# Patient Record
Sex: FEMALE | Race: WHITE | Employment: OTHER | URBAN - METROPOLITAN AREA
[De-identification: names, ages, dates, MRNs, and addresses within clinical notes are randomized per-mention and may not be internally consistent; named-entity substitution may affect disease eponyms.]

---

## 2017-02-27 PROBLEM — F39 AFFECTIVE DISORDER (HCC): Status: ACTIVE | Noted: 2017-02-27

## 2017-02-27 PROBLEM — R73.9 ELEVATED BLOOD SUGAR: Status: ACTIVE | Noted: 2017-02-27

## 2017-10-17 PROBLEM — M79.2 NEUROPATHIC PAIN: Status: ACTIVE | Noted: 2017-10-17

## 2017-10-17 PROBLEM — K21.9 GASTROESOPHAGEAL REFLUX DISEASE WITHOUT ESOPHAGITIS: Status: ACTIVE | Noted: 2017-10-17

## 2017-10-17 PROBLEM — H40.9 GLAUCOMA: Status: ACTIVE | Noted: 2017-10-17

## 2018-04-05 ENCOUNTER — HOME HEALTH ADMISSION (OUTPATIENT)
Dept: HOME HEALTH SERVICES | Facility: HOME HEALTH | Age: 83
End: 2018-04-05
Payer: MEDICARE

## 2018-04-05 PROBLEM — G89.29 CHRONIC PAIN OF BOTH SHOULDERS: Status: ACTIVE | Noted: 2018-04-05

## 2018-04-05 PROBLEM — M25.511 CHRONIC PAIN OF BOTH SHOULDERS: Status: ACTIVE | Noted: 2018-04-05

## 2018-04-05 PROBLEM — M25.512 CHRONIC PAIN OF BOTH SHOULDERS: Status: ACTIVE | Noted: 2018-04-05

## 2018-04-07 ENCOUNTER — HOME CARE VISIT (OUTPATIENT)
Dept: SCHEDULING | Facility: HOME HEALTH | Age: 83
End: 2018-04-07
Payer: MEDICARE

## 2018-04-07 VITALS
RESPIRATION RATE: 16 BRPM | OXYGEN SATURATION: 96 % | DIASTOLIC BLOOD PRESSURE: 74 MMHG | HEART RATE: 74 BPM | TEMPERATURE: 98.1 F | SYSTOLIC BLOOD PRESSURE: 126 MMHG

## 2018-04-07 PROCEDURE — G0299 HHS/HOSPICE OF RN EA 15 MIN: HCPCS

## 2018-04-07 PROCEDURE — 3331090002 HH PPS REVENUE DEBIT

## 2018-04-07 PROCEDURE — 400013 HH SOC

## 2018-04-07 PROCEDURE — 3331090001 HH PPS REVENUE CREDIT

## 2018-04-08 PROCEDURE — 3331090001 HH PPS REVENUE CREDIT

## 2018-04-08 PROCEDURE — 3331090002 HH PPS REVENUE DEBIT

## 2018-04-09 PROCEDURE — 3331090002 HH PPS REVENUE DEBIT

## 2018-04-09 PROCEDURE — 3331090001 HH PPS REVENUE CREDIT

## 2018-04-10 ENCOUNTER — HOME CARE VISIT (OUTPATIENT)
Dept: SCHEDULING | Facility: HOME HEALTH | Age: 83
End: 2018-04-10
Payer: MEDICARE

## 2018-04-10 PROCEDURE — 3331090002 HH PPS REVENUE DEBIT

## 2018-04-10 PROCEDURE — G0299 HHS/HOSPICE OF RN EA 15 MIN: HCPCS

## 2018-04-10 PROCEDURE — 3331090001 HH PPS REVENUE CREDIT

## 2018-04-11 VITALS
DIASTOLIC BLOOD PRESSURE: 74 MMHG | RESPIRATION RATE: 20 BRPM | HEART RATE: 80 BPM | OXYGEN SATURATION: 94 % | SYSTOLIC BLOOD PRESSURE: 130 MMHG | TEMPERATURE: 98 F

## 2018-04-11 PROCEDURE — 3331090001 HH PPS REVENUE CREDIT

## 2018-04-11 PROCEDURE — 3331090002 HH PPS REVENUE DEBIT

## 2018-04-12 ENCOUNTER — HOME CARE VISIT (OUTPATIENT)
Dept: SCHEDULING | Facility: HOME HEALTH | Age: 83
End: 2018-04-12
Payer: MEDICARE

## 2018-04-12 VITALS
HEART RATE: 79 BPM | DIASTOLIC BLOOD PRESSURE: 70 MMHG | RESPIRATION RATE: 18 BRPM | OXYGEN SATURATION: 98 % | SYSTOLIC BLOOD PRESSURE: 132 MMHG | TEMPERATURE: 97.5 F

## 2018-04-12 PROCEDURE — 3331090002 HH PPS REVENUE DEBIT

## 2018-04-12 PROCEDURE — G0151 HHCP-SERV OF PT,EA 15 MIN: HCPCS

## 2018-04-12 PROCEDURE — 3331090001 HH PPS REVENUE CREDIT

## 2018-04-13 PROCEDURE — 3331090001 HH PPS REVENUE CREDIT

## 2018-04-13 PROCEDURE — 3331090002 HH PPS REVENUE DEBIT

## 2018-04-14 PROCEDURE — 3331090001 HH PPS REVENUE CREDIT

## 2018-04-14 PROCEDURE — 3331090002 HH PPS REVENUE DEBIT

## 2018-04-15 PROCEDURE — 3331090001 HH PPS REVENUE CREDIT

## 2018-04-15 PROCEDURE — 3331090002 HH PPS REVENUE DEBIT

## 2018-04-16 PROCEDURE — 3331090002 HH PPS REVENUE DEBIT

## 2018-04-16 PROCEDURE — 3331090001 HH PPS REVENUE CREDIT

## 2018-04-17 PROCEDURE — 3331090002 HH PPS REVENUE DEBIT

## 2018-04-17 PROCEDURE — 3331090001 HH PPS REVENUE CREDIT

## 2018-04-18 PROCEDURE — 3331090001 HH PPS REVENUE CREDIT

## 2018-04-18 PROCEDURE — 3331090002 HH PPS REVENUE DEBIT

## 2018-04-19 ENCOUNTER — HOME CARE VISIT (OUTPATIENT)
Dept: SCHEDULING | Facility: HOME HEALTH | Age: 83
End: 2018-04-19
Payer: MEDICARE

## 2018-04-19 VITALS
HEART RATE: 80 BPM | TEMPERATURE: 97.9 F | OXYGEN SATURATION: 98 % | SYSTOLIC BLOOD PRESSURE: 140 MMHG | RESPIRATION RATE: 24 BRPM | DIASTOLIC BLOOD PRESSURE: 80 MMHG

## 2018-04-19 PROCEDURE — G0157 HHC PT ASSISTANT EA 15: HCPCS

## 2018-04-19 PROCEDURE — 3331090002 HH PPS REVENUE DEBIT

## 2018-04-19 PROCEDURE — 3331090001 HH PPS REVENUE CREDIT

## 2018-04-20 PROCEDURE — 3331090002 HH PPS REVENUE DEBIT

## 2018-04-20 PROCEDURE — 3331090001 HH PPS REVENUE CREDIT

## 2018-04-21 PROCEDURE — 3331090002 HH PPS REVENUE DEBIT

## 2018-04-21 PROCEDURE — 3331090001 HH PPS REVENUE CREDIT

## 2018-04-22 PROCEDURE — 3331090002 HH PPS REVENUE DEBIT

## 2018-04-22 PROCEDURE — 3331090001 HH PPS REVENUE CREDIT

## 2018-04-23 PROCEDURE — 3331090002 HH PPS REVENUE DEBIT

## 2018-04-23 PROCEDURE — 3331090001 HH PPS REVENUE CREDIT

## 2018-04-24 ENCOUNTER — HOME CARE VISIT (OUTPATIENT)
Dept: SCHEDULING | Facility: HOME HEALTH | Age: 83
End: 2018-04-24
Payer: MEDICARE

## 2018-04-24 ENCOUNTER — HOME CARE VISIT (OUTPATIENT)
Dept: HOME HEALTH SERVICES | Facility: HOME HEALTH | Age: 83
End: 2018-04-24
Payer: MEDICARE

## 2018-04-24 VITALS
DIASTOLIC BLOOD PRESSURE: 94 MMHG | HEART RATE: 80 BPM | RESPIRATION RATE: 16 BRPM | SYSTOLIC BLOOD PRESSURE: 158 MMHG | TEMPERATURE: 97.5 F | OXYGEN SATURATION: 95 %

## 2018-04-24 VITALS
DIASTOLIC BLOOD PRESSURE: 80 MMHG | SYSTOLIC BLOOD PRESSURE: 130 MMHG | TEMPERATURE: 98 F | HEART RATE: 72 BPM | OXYGEN SATURATION: 97 % | RESPIRATION RATE: 18 BRPM

## 2018-04-24 PROCEDURE — G0299 HHS/HOSPICE OF RN EA 15 MIN: HCPCS

## 2018-04-24 PROCEDURE — 3331090001 HH PPS REVENUE CREDIT

## 2018-04-24 PROCEDURE — 3331090002 HH PPS REVENUE DEBIT

## 2018-04-24 PROCEDURE — G0157 HHC PT ASSISTANT EA 15: HCPCS

## 2018-04-25 PROCEDURE — 3331090001 HH PPS REVENUE CREDIT

## 2018-04-25 PROCEDURE — 3331090002 HH PPS REVENUE DEBIT

## 2018-04-26 PROCEDURE — 3331090002 HH PPS REVENUE DEBIT

## 2018-04-26 PROCEDURE — 3331090001 HH PPS REVENUE CREDIT

## 2018-04-27 PROCEDURE — 3331090001 HH PPS REVENUE CREDIT

## 2018-04-27 PROCEDURE — 3331090002 HH PPS REVENUE DEBIT

## 2018-04-28 PROCEDURE — 3331090001 HH PPS REVENUE CREDIT

## 2018-04-28 PROCEDURE — 3331090002 HH PPS REVENUE DEBIT

## 2018-04-29 PROCEDURE — 3331090002 HH PPS REVENUE DEBIT

## 2018-04-29 PROCEDURE — 3331090001 HH PPS REVENUE CREDIT

## 2018-04-30 ENCOUNTER — HOME CARE VISIT (OUTPATIENT)
Dept: SCHEDULING | Facility: HOME HEALTH | Age: 83
End: 2018-04-30
Payer: MEDICARE

## 2018-04-30 VITALS — RESPIRATION RATE: 18 BRPM | SYSTOLIC BLOOD PRESSURE: 126 MMHG | HEART RATE: 82 BPM | DIASTOLIC BLOOD PRESSURE: 74 MMHG

## 2018-04-30 PROCEDURE — 3331090001 HH PPS REVENUE CREDIT

## 2018-04-30 PROCEDURE — G0151 HHCP-SERV OF PT,EA 15 MIN: HCPCS

## 2018-04-30 PROCEDURE — 3331090002 HH PPS REVENUE DEBIT

## 2018-05-01 ENCOUNTER — HOME CARE VISIT (OUTPATIENT)
Dept: SCHEDULING | Facility: HOME HEALTH | Age: 83
End: 2018-05-01
Payer: MEDICARE

## 2018-05-01 VITALS
RESPIRATION RATE: 18 BRPM | HEART RATE: 83 BPM | DIASTOLIC BLOOD PRESSURE: 82 MMHG | SYSTOLIC BLOOD PRESSURE: 136 MMHG | TEMPERATURE: 98.5 F | OXYGEN SATURATION: 98 %

## 2018-05-01 LAB
INR BLD: 4.1 (ref 0.9–1.1)
PT POC: 45.6 SECONDS (ref 11.8–14.9)

## 2018-05-01 PROCEDURE — 3331090002 HH PPS REVENUE DEBIT

## 2018-05-01 PROCEDURE — 3331090001 HH PPS REVENUE CREDIT

## 2018-05-01 PROCEDURE — G0299 HHS/HOSPICE OF RN EA 15 MIN: HCPCS

## 2018-10-05 ENCOUNTER — HOSPITAL ENCOUNTER (OUTPATIENT)
Dept: LAB | Age: 83
Discharge: HOME | End: 2018-10-05

## 2018-10-05 LAB
INR PPP: 2.6
PROTHROMBIN TIME: 27 SEC (ref 11.5–14.5)

## 2018-10-05 PROCEDURE — 85610 PROTHROMBIN TIME: CPT

## 2019-01-11 ENCOUNTER — HOSPITAL ENCOUNTER (OUTPATIENT)
Dept: LAB | Age: 84
Discharge: HOME | End: 2019-01-11

## 2019-01-11 LAB
INR PPP: 1.8
PROTHROMBIN TIME: 20.8 SEC (ref 11.7–14.5)

## 2019-01-11 PROCEDURE — 85610 PROTHROMBIN TIME: CPT

## 2020-07-31 ENCOUNTER — HOME HEALTH ADMISSION (OUTPATIENT)
Dept: HOME HEALTH SERVICES | Facility: HOME HEALTH | Age: 85
End: 2020-07-31

## 2020-07-31 PROBLEM — M00.9 CHRONIC INFECTION OF RIGHT KNEE (HCC): Status: ACTIVE | Noted: 2020-07-31

## 2020-07-31 PROBLEM — Z91.199 H/O NONCOMPLIANCE WITH MEDICAL TREATMENT, PRESENTING HAZARDS TO HEALTH: Status: ACTIVE | Noted: 2020-07-31

## 2020-07-31 PROBLEM — R53.81 PHYSICAL DEBILITY: Status: ACTIVE | Noted: 2020-07-31

## 2020-07-31 PROBLEM — Z91.81 AT HIGH RISK FOR INJURY RELATED TO FALL: Status: ACTIVE | Noted: 2020-07-31

## 2021-01-01 ENCOUNTER — HOME CARE VISIT (OUTPATIENT)
Dept: SCHEDULING | Facility: HOME HEALTH | Age: 86
End: 2021-01-01
Payer: MEDICARE

## 2021-01-01 ENCOUNTER — HOSPITAL ENCOUNTER (OUTPATIENT)
Dept: LAB | Age: 86
Discharge: HOME | End: 2021-12-21

## 2021-01-01 ENCOUNTER — HOSPITAL ENCOUNTER (OUTPATIENT)
Dept: LAB | Age: 86
Discharge: HOME | End: 2021-10-04

## 2021-01-01 VITALS
DIASTOLIC BLOOD PRESSURE: 59 MMHG | SYSTOLIC BLOOD PRESSURE: 110 MMHG | TEMPERATURE: 97.3 F | HEART RATE: 60 BPM | RESPIRATION RATE: 18 BRPM | OXYGEN SATURATION: 97 %

## 2021-01-01 VITALS
RESPIRATION RATE: 18 BRPM | TEMPERATURE: 98.2 F | DIASTOLIC BLOOD PRESSURE: 84 MMHG | SYSTOLIC BLOOD PRESSURE: 178 MMHG | HEART RATE: 78 BPM | OXYGEN SATURATION: 95 %

## 2021-01-01 VITALS
RESPIRATION RATE: 18 BRPM | TEMPERATURE: 98.2 F | HEART RATE: 78 BPM | DIASTOLIC BLOOD PRESSURE: 112 MMHG | SYSTOLIC BLOOD PRESSURE: 149 MMHG | OXYGEN SATURATION: 97 %

## 2021-01-01 VITALS
DIASTOLIC BLOOD PRESSURE: 80 MMHG | OXYGEN SATURATION: 95 % | RESPIRATION RATE: 18 BRPM | TEMPERATURE: 97.4 F | HEART RATE: 68 BPM | SYSTOLIC BLOOD PRESSURE: 140 MMHG

## 2021-01-01 VITALS
DIASTOLIC BLOOD PRESSURE: 75 MMHG | RESPIRATION RATE: 18 BRPM | HEART RATE: 82 BPM | OXYGEN SATURATION: 97 % | TEMPERATURE: 97.9 F | SYSTOLIC BLOOD PRESSURE: 130 MMHG

## 2021-01-01 VITALS
SYSTOLIC BLOOD PRESSURE: 104 MMHG | RESPIRATION RATE: 18 BRPM | OXYGEN SATURATION: 97 % | TEMPERATURE: 96 F | DIASTOLIC BLOOD PRESSURE: 70 MMHG | HEART RATE: 54 BPM

## 2021-01-01 LAB
INR BLD: 2.4 (ref 0.9–1.1)
INR BLD: 5.9 (ref 0.9–1.1)
INR PPP: 1.2
INR PPP: 2
PROTHROMBIN TIME: 15.2 SEC (ref 12.6–14.5)
PROTHROMBIN TIME: 22.8 SEC (ref 12.6–14.5)
PT POC: 28.2 SECONDS (ref 11.8–14.9)
PT POC: 70.7 SECONDS (ref 11.8–14.9)

## 2021-01-01 PROCEDURE — 3331090002 HH PPS REVENUE DEBIT

## 2021-01-01 PROCEDURE — G0299 HHS/HOSPICE OF RN EA 15 MIN: HCPCS

## 2021-01-01 PROCEDURE — 3331090001 HH PPS REVENUE CREDIT

## 2021-01-01 PROCEDURE — 85610 PROTHROMBIN TIME: CPT

## 2021-01-01 PROCEDURE — A6454 SELF-ADHER BAND W>=3" <5"/YD: HCPCS

## 2021-01-05 ENCOUNTER — HOME HEALTH ADMISSION (OUTPATIENT)
Dept: HOME HEALTH SERVICES | Facility: HOME HEALTH | Age: 86
End: 2021-01-05
Payer: MEDICARE

## 2021-01-06 ENCOUNTER — HOME CARE VISIT (OUTPATIENT)
Dept: SCHEDULING | Facility: HOME HEALTH | Age: 86
End: 2021-01-06
Payer: MEDICARE

## 2021-01-06 VITALS
SYSTOLIC BLOOD PRESSURE: 102 MMHG | DIASTOLIC BLOOD PRESSURE: 62 MMHG | TEMPERATURE: 96.5 F | RESPIRATION RATE: 18 BRPM | OXYGEN SATURATION: 97 % | HEART RATE: 58 BPM

## 2021-01-06 PROCEDURE — A6223 GAUZE >16<=48 NO W/SAL W/O B: HCPCS

## 2021-01-06 PROCEDURE — A4452 WATERPROOF TAPE: HCPCS

## 2021-01-06 PROCEDURE — G0299 HHS/HOSPICE OF RN EA 15 MIN: HCPCS

## 2021-01-06 PROCEDURE — 400018 HH-NO PAY CLAIM PROCEDURE

## 2021-01-06 PROCEDURE — 400013 HH SOC

## 2021-01-06 PROCEDURE — 3331090002 HH PPS REVENUE DEBIT

## 2021-01-06 PROCEDURE — A6454 SELF-ADHER BAND W>=3" <5"/YD: HCPCS

## 2021-01-06 PROCEDURE — A6456 ZINC PASTE BAND W >=3"<5"/YD: HCPCS

## 2021-01-06 PROCEDURE — 3331090001 HH PPS REVENUE CREDIT

## 2021-01-06 PROCEDURE — A9270 NON-COVERED ITEM OR SERVICE: HCPCS

## 2021-01-07 PROCEDURE — 3331090002 HH PPS REVENUE DEBIT

## 2021-01-07 PROCEDURE — 3331090001 HH PPS REVENUE CREDIT

## 2021-01-08 ENCOUNTER — HOME CARE VISIT (OUTPATIENT)
Dept: HOME HEALTH SERVICES | Facility: HOME HEALTH | Age: 86
End: 2021-01-08
Payer: MEDICARE

## 2021-01-08 PROCEDURE — 3331090002 HH PPS REVENUE DEBIT

## 2021-01-08 PROCEDURE — 3331090001 HH PPS REVENUE CREDIT

## 2021-01-09 ENCOUNTER — HOME CARE VISIT (OUTPATIENT)
Dept: SCHEDULING | Facility: HOME HEALTH | Age: 86
End: 2021-01-09
Payer: MEDICARE

## 2021-01-09 PROCEDURE — 3331090002 HH PPS REVENUE DEBIT

## 2021-01-09 PROCEDURE — 3331090001 HH PPS REVENUE CREDIT

## 2021-01-10 PROCEDURE — 3331090001 HH PPS REVENUE CREDIT

## 2021-01-10 PROCEDURE — 3331090002 HH PPS REVENUE DEBIT

## 2021-01-11 ENCOUNTER — HOME CARE VISIT (OUTPATIENT)
Dept: SCHEDULING | Facility: HOME HEALTH | Age: 86
End: 2021-01-11
Payer: MEDICARE

## 2021-01-11 VITALS
OXYGEN SATURATION: 99 % | SYSTOLIC BLOOD PRESSURE: 104 MMHG | RESPIRATION RATE: 18 BRPM | HEART RATE: 74 BPM | TEMPERATURE: 97.1 F | DIASTOLIC BLOOD PRESSURE: 64 MMHG

## 2021-01-11 PROCEDURE — G0299 HHS/HOSPICE OF RN EA 15 MIN: HCPCS

## 2021-01-11 PROCEDURE — 3331090001 HH PPS REVENUE CREDIT

## 2021-01-11 PROCEDURE — 3331090002 HH PPS REVENUE DEBIT

## 2021-01-12 PROCEDURE — 3331090002 HH PPS REVENUE DEBIT

## 2021-01-12 PROCEDURE — 3331090001 HH PPS REVENUE CREDIT

## 2021-01-13 PROCEDURE — 3331090001 HH PPS REVENUE CREDIT

## 2021-01-13 PROCEDURE — 3331090002 HH PPS REVENUE DEBIT

## 2021-01-14 ENCOUNTER — HOME CARE VISIT (OUTPATIENT)
Dept: SCHEDULING | Facility: HOME HEALTH | Age: 86
End: 2021-01-14
Payer: MEDICARE

## 2021-01-14 VITALS
SYSTOLIC BLOOD PRESSURE: 129 MMHG | DIASTOLIC BLOOD PRESSURE: 70 MMHG | RESPIRATION RATE: 18 BRPM | TEMPERATURE: 97.2 F | OXYGEN SATURATION: 98 % | HEART RATE: 88 BPM

## 2021-01-14 PROCEDURE — G0299 HHS/HOSPICE OF RN EA 15 MIN: HCPCS

## 2021-01-14 PROCEDURE — 3331090002 HH PPS REVENUE DEBIT

## 2021-01-14 PROCEDURE — 3331090001 HH PPS REVENUE CREDIT

## 2021-01-15 PROCEDURE — 3331090002 HH PPS REVENUE DEBIT

## 2021-01-15 PROCEDURE — 3331090001 HH PPS REVENUE CREDIT

## 2021-01-16 PROCEDURE — 3331090001 HH PPS REVENUE CREDIT

## 2021-01-16 PROCEDURE — 3331090002 HH PPS REVENUE DEBIT

## 2021-01-17 PROCEDURE — 3331090002 HH PPS REVENUE DEBIT

## 2021-01-17 PROCEDURE — 3331090001 HH PPS REVENUE CREDIT

## 2022-01-01 ENCOUNTER — HOSPITAL ENCOUNTER (INPATIENT)
Age: 87
LOS: 1 days | DRG: 871 | End: 2022-01-18
Attending: FAMILY MEDICINE | Admitting: EMERGENCY MEDICINE
Payer: MEDICARE

## 2022-01-01 ENCOUNTER — HOSPITAL ENCOUNTER (EMERGENCY)
Age: 87
Discharge: STILL ADMITTED OR ADMINISTRATIVE | DRG: 871 | End: 2022-01-18
Attending: EMERGENCY MEDICINE
Payer: MEDICARE

## 2022-01-01 ENCOUNTER — APPOINTMENT (OUTPATIENT)
Dept: GENERAL RADIOLOGY | Age: 87
DRG: 871 | End: 2022-01-01
Attending: EMERGENCY MEDICINE
Payer: MEDICARE

## 2022-01-01 VITALS
DIASTOLIC BLOOD PRESSURE: 57 MMHG | HEIGHT: 61 IN | WEIGHT: 275 LBS | OXYGEN SATURATION: 90 % | RESPIRATION RATE: 28 BRPM | SYSTOLIC BLOOD PRESSURE: 126 MMHG | HEART RATE: 93 BPM | BODY MASS INDEX: 51.92 KG/M2 | TEMPERATURE: 97.5 F

## 2022-01-01 VITALS
DIASTOLIC BLOOD PRESSURE: 58 MMHG | RESPIRATION RATE: 47 BRPM | HEIGHT: 61 IN | WEIGHT: 243.3 LBS | HEART RATE: 121 BPM | TEMPERATURE: 98 F | BODY MASS INDEX: 45.94 KG/M2 | SYSTOLIC BLOOD PRESSURE: 123 MMHG | OXYGEN SATURATION: 81 %

## 2022-01-01 DIAGNOSIS — J96.01 ACUTE RESPIRATORY FAILURE WITH HYPOXIA (HCC): ICD-10-CM

## 2022-01-01 DIAGNOSIS — J12.82 PNEUMONIA DUE TO COVID-19 VIRUS: ICD-10-CM

## 2022-01-01 DIAGNOSIS — A41.9 SEVERE SEPSIS (HCC): ICD-10-CM

## 2022-01-01 DIAGNOSIS — R65.20 SEVERE SEPSIS (HCC): ICD-10-CM

## 2022-01-01 DIAGNOSIS — U07.1 PNEUMONIA DUE TO COVID-19 VIRUS: ICD-10-CM

## 2022-01-01 DIAGNOSIS — R09.02 HYPOXEMIA: Primary | ICD-10-CM

## 2022-01-01 DIAGNOSIS — R57.9 SHOCK (HCC): ICD-10-CM

## 2022-01-01 DIAGNOSIS — U07.1 COVID-19 VIRUS INFECTION: ICD-10-CM

## 2022-01-01 LAB
ALBUMIN SERPL-MCNC: 2 G/DL (ref 3.2–4.6)
ALBUMIN SERPL-MCNC: 2.2 G/DL (ref 3.2–4.6)
ALBUMIN/GLOB SERPL: 0.5 {RATIO} (ref 1.2–3.5)
ALBUMIN/GLOB SERPL: 0.5 {RATIO} (ref 1.2–3.5)
ALP SERPL-CCNC: 123 U/L (ref 50–130)
ALP SERPL-CCNC: 88 U/L (ref 50–136)
ALT SERPL-CCNC: 14 U/L (ref 12–65)
ALT SERPL-CCNC: 15 U/L (ref 12–65)
ANION GAP SERPL CALC-SCNC: 7 MMOL/L (ref 7–16)
ANION GAP SERPL CALC-SCNC: 7 MMOL/L (ref 7–16)
APPEARANCE UR: ABNORMAL
ARTERIAL PATENCY WRIST A: POSITIVE
AST SERPL-CCNC: 30 U/L (ref 15–37)
AST SERPL-CCNC: 30 U/L (ref 15–37)
ATRIAL RATE: 115 BPM
BACTERIA URNS QL MICRO: 0 /HPF
BASE DEFICIT BLD-SCNC: 1.3 MMOL/L
BASE EXCESS BLD CALC-SCNC: 0.5 MMOL/L
BASE EXCESS BLD CALC-SCNC: 1.2 MMOL/L
BASE EXCESS BLD CALC-SCNC: 1.8 MMOL/L
BASOPHILS # BLD: 0 K/UL (ref 0–0.2)
BASOPHILS # BLD: 0.1 K/UL (ref 0–0.2)
BASOPHILS NFR BLD: 0 % (ref 0–2)
BASOPHILS NFR BLD: 1 % (ref 0–2)
BDY SITE: ABNORMAL
BILIRUB SERPL-MCNC: 0.7 MG/DL (ref 0.2–1.1)
BILIRUB SERPL-MCNC: 1 MG/DL (ref 0.2–1.1)
BILIRUB UR QL: ABNORMAL
BUN SERPL-MCNC: 39 MG/DL (ref 8–23)
BUN SERPL-MCNC: 43 MG/DL (ref 8–23)
CA-I BLD-MCNC: 1.17 MMOL/L (ref 1.12–1.32)
CA-I BLD-MCNC: 1.17 MMOL/L (ref 1.12–1.32)
CA-I BLD-MCNC: 1.21 MMOL/L (ref 1.12–1.32)
CALCIUM SERPL-MCNC: 8.7 MG/DL (ref 8.3–10.4)
CALCIUM SERPL-MCNC: 9.2 MG/DL (ref 8.3–10.4)
CALCULATED R AXIS, ECG10: 15 DEGREES
CALCULATED T AXIS, ECG11: 3 DEGREES
CASTS URNS QL MICRO: ABNORMAL /LPF
CHLORIDE SERPL-SCNC: 100 MMOL/L (ref 98–107)
CHLORIDE SERPL-SCNC: 97 MMOL/L (ref 98–107)
CO2 BLD-SCNC: 29 MMOL/L (ref 13–23)
CO2 BLD-SCNC: 30 MMOL/L (ref 13–23)
CO2 BLD-SCNC: 31 MMOL/L (ref 13–23)
CO2 BLD-SCNC: 31 MMOL/L (ref 13–23)
CO2 SERPL-SCNC: 28 MMOL/L (ref 21–32)
CO2 SERPL-SCNC: 30 MMOL/L (ref 21–32)
COLOR UR: ABNORMAL
CREAT SERPL-MCNC: 0.97 MG/DL (ref 0.6–1)
CREAT SERPL-MCNC: 1.12 MG/DL (ref 0.6–1)
CRP SERPL-MCNC: 28.2 MG/DL (ref 0–0.9)
DIAGNOSIS, 93000: NORMAL
DIFFERENTIAL METHOD BLD: ABNORMAL
DIFFERENTIAL METHOD BLD: ABNORMAL
EOSINOPHIL # BLD: 0 K/UL (ref 0–0.8)
EOSINOPHIL # BLD: 0 K/UL (ref 0–0.8)
EOSINOPHIL NFR BLD: 0 % (ref 0.5–7.8)
EOSINOPHIL NFR BLD: 0 % (ref 0.5–7.8)
EPI CELLS #/AREA URNS HPF: ABNORMAL /HPF
ERYTHROCYTE [DISTWIDTH] IN BLOOD BY AUTOMATED COUNT: 16.1 % (ref 11.9–14.6)
ERYTHROCYTE [DISTWIDTH] IN BLOOD BY AUTOMATED COUNT: 16.1 % (ref 11.9–14.6)
FIO2 ON VENT: 100 %
GAS FLOW.O2 O2 DELIVERY SYS: ABNORMAL L/MIN
GLOBULIN SER CALC-MCNC: 3.8 G/DL (ref 2.3–3.5)
GLOBULIN SER CALC-MCNC: 4.3 G/DL (ref 2.3–3.5)
GLUCOSE BLD STRIP.AUTO-MCNC: 86 MG/DL (ref 65–100)
GLUCOSE BLD STRIP.AUTO-MCNC: 89 MG/DL (ref 65–100)
GLUCOSE BLD STRIP.AUTO-MCNC: 93 MG/DL (ref 65–100)
GLUCOSE SERPL-MCNC: 102 MG/DL (ref 65–100)
GLUCOSE SERPL-MCNC: 90 MG/DL (ref 65–100)
GLUCOSE UR STRIP.AUTO-MCNC: NEGATIVE MG/DL
HCO3 BLD-SCNC: 27.9 MMOL/L (ref 22–26)
HCO3 BLD-SCNC: 28.9 MMOL/L (ref 22–26)
HCO3 BLD-SCNC: 29.7 MMOL/L (ref 22–26)
HCO3 BLD-SCNC: 30.3 MMOL/L (ref 22–26)
HCT VFR BLD AUTO: 40.5 % (ref 35.8–46.3)
HCT VFR BLD AUTO: 44.1 % (ref 35.8–46.3)
HGB BLD-MCNC: 12.4 G/DL (ref 11.7–15.4)
HGB BLD-MCNC: 13.9 G/DL (ref 11.7–15.4)
HGB UR QL STRIP: NEGATIVE
IMM GRANULOCYTES # BLD AUTO: 0.1 K/UL (ref 0–0.5)
IMM GRANULOCYTES # BLD AUTO: 0.2 K/UL (ref 0–0.5)
IMM GRANULOCYTES NFR BLD AUTO: 1 % (ref 0–5)
IMM GRANULOCYTES NFR BLD AUTO: 1 % (ref 0–5)
INR PPP: 5.3
INR PPP: 7.4
INSPIRATION.DURATION SETTING TIME VENT: 1 SEC
KETONES UR QL STRIP.AUTO: ABNORMAL MG/DL
LACTATE SERPL-SCNC: 2 MMOL/L (ref 0.4–2)
LEUKOCYTE ESTERASE UR QL STRIP.AUTO: NEGATIVE
LYMPHOCYTES # BLD: 0.4 K/UL (ref 0.5–4.6)
LYMPHOCYTES # BLD: 0.9 K/UL (ref 0.5–4.6)
LYMPHOCYTES NFR BLD: 3 % (ref 13–44)
LYMPHOCYTES NFR BLD: 4 % (ref 13–44)
MCH RBC QN AUTO: 26.5 PG (ref 26.1–32.9)
MCH RBC QN AUTO: 27 PG (ref 26.1–32.9)
MCHC RBC AUTO-ENTMCNC: 30.6 G/DL (ref 31.4–35)
MCHC RBC AUTO-ENTMCNC: 31.5 G/DL (ref 31.4–35)
MCV RBC AUTO: 85.6 FL (ref 79.6–97.8)
MCV RBC AUTO: 86.5 FL (ref 79.6–97.8)
MONOCYTES # BLD: 0.3 K/UL (ref 0.1–1.3)
MONOCYTES # BLD: 0.3 K/UL (ref 0.1–1.3)
MONOCYTES NFR BLD: 2 % (ref 4–12)
MONOCYTES NFR BLD: 2 % (ref 4–12)
NEUTS SEG # BLD: 11.6 K/UL (ref 1.7–8.2)
NEUTS SEG # BLD: 18.9 K/UL (ref 1.7–8.2)
NEUTS SEG NFR BLD: 93 % (ref 43–78)
NEUTS SEG NFR BLD: 93 % (ref 43–78)
NITRITE UR QL STRIP.AUTO: NEGATIVE
NRBC # BLD: 0 K/UL (ref 0–0.2)
NRBC # BLD: 0 K/UL (ref 0–0.2)
O2/TOTAL GAS SETTING VFR VENT: 100 %
PCO2 BLD: 61.3 MMHG (ref 35–45)
PCO2 BLD: 62.5 MMHG (ref 35–45)
PCO2 BLD: 62.6 MMHG (ref 35–45)
PCO2 BLD: 66.1 MMHG (ref 35–45)
PH BLD: 7.23 [PH] (ref 7.35–7.45)
PH BLD: 7.28 [PH] (ref 7.35–7.45)
PH BLD: 7.29 [PH] (ref 7.35–7.45)
PH BLD: 7.29 [PH] (ref 7.35–7.45)
PH UR STRIP: 5 [PH] (ref 5–9)
PIP ISTAT,IPIP: 17
PLATELET # BLD AUTO: 268 K/UL (ref 150–450)
PLATELET # BLD AUTO: 325 K/UL (ref 150–450)
PMV BLD AUTO: 10.9 FL (ref 9.4–12.3)
PMV BLD AUTO: 11.1 FL (ref 9.4–12.3)
PO2 BLD: 58 MMHG (ref 75–100)
PO2 BLD: 60 MMHG (ref 75–100)
PO2 BLD: 62 MMHG (ref 75–100)
PO2 BLD: 65 MMHG (ref 75–100)
POTASSIUM BLD-SCNC: 4.6 MMOL/L (ref 3.5–5.1)
POTASSIUM BLD-SCNC: 4.7 MMOL/L (ref 3.5–5.1)
POTASSIUM BLD-SCNC: 4.7 MMOL/L (ref 3.5–5.1)
POTASSIUM SERPL-SCNC: 4.3 MMOL/L (ref 3.5–5.1)
POTASSIUM SERPL-SCNC: 4.8 MMOL/L (ref 3.5–5.1)
PROCALCITONIN SERPL-MCNC: 0.79 NG/ML (ref 0–0.49)
PROT SERPL-MCNC: 5.8 G/DL (ref 6.3–8.2)
PROT SERPL-MCNC: 6.5 G/DL (ref 6.3–8.2)
PROT UR STRIP-MCNC: 30 MG/DL
PROTHROMBIN TIME: 49 SEC (ref 12.6–14.5)
PROTHROMBIN TIME: 62.3 SEC (ref 12.6–14.5)
Q-T INTERVAL, ECG07: 288 MS
QRS DURATION, ECG06: 76 MS
QTC CALCULATION (BEZET), ECG08: 401 MS
RBC # BLD AUTO: 4.68 M/UL (ref 4.05–5.2)
RBC # BLD AUTO: 5.15 M/UL (ref 4.05–5.2)
RBC #/AREA URNS HPF: ABNORMAL /HPF
SAO2 % BLD: 85 %
SAO2 % BLD: 85.5 % (ref 95–98)
SAO2 % BLD: 86 %
SAO2 % BLD: 89 %
SARS-COV-2, NAA: DETECTED
SERVICE CMNT-IMP: ABNORMAL
SODIUM BLD-SCNC: 134 MMOL/L (ref 136–145)
SODIUM BLD-SCNC: 134 MMOL/L (ref 136–145)
SODIUM BLD-SCNC: 135 MMOL/L (ref 136–145)
SODIUM SERPL-SCNC: 134 MMOL/L (ref 136–145)
SODIUM SERPL-SCNC: 135 MMOL/L (ref 136–145)
SP GR UR REFRACTOMETRY: 1.03 (ref 1–1.02)
SPECIMEN SITE: ABNORMAL
SPECIMEN TYPE: ABNORMAL
TOTAL RESP. RATE, ITRR: 38
UROBILINOGEN UR QL STRIP.AUTO: 1 EU/DL (ref 0.2–1)
VENTRICULAR RATE, ECG03: 117 BPM
VT SETTING VENT: 423 ML
WBC # BLD AUTO: 12.4 K/UL (ref 4.3–11.1)
WBC # BLD AUTO: 20.4 K/UL (ref 4.3–11.1)
WBC URNS QL MICRO: ABNORMAL /HPF

## 2022-01-01 PROCEDURE — 74011000250 HC RX REV CODE- 250: Performed by: EMERGENCY MEDICINE

## 2022-01-01 PROCEDURE — 83605 ASSAY OF LACTIC ACID: CPT

## 2022-01-01 PROCEDURE — 82947 ASSAY GLUCOSE BLOOD QUANT: CPT

## 2022-01-01 PROCEDURE — 74011000250 HC RX REV CODE- 250: Performed by: INTERNAL MEDICINE

## 2022-01-01 PROCEDURE — XW0DXM6 INTRODUCTION OF BARICITINIB INTO MOUTH AND PHARYNX, EXTERNAL APPROACH, NEW TECHNOLOGY GROUP 6: ICD-10-PCS | Performed by: FAMILY MEDICINE

## 2022-01-01 PROCEDURE — 74011250636 HC RX REV CODE- 250/636: Performed by: EMERGENCY MEDICINE

## 2022-01-01 PROCEDURE — 85610 PROTHROMBIN TIME: CPT

## 2022-01-01 PROCEDURE — 71045 X-RAY EXAM CHEST 1 VIEW: CPT

## 2022-01-01 PROCEDURE — 94660 CPAP INITIATION&MGMT: CPT

## 2022-01-01 PROCEDURE — 80053 COMPREHEN METABOLIC PANEL: CPT

## 2022-01-01 PROCEDURE — 74011000258 HC RX REV CODE- 258: Performed by: EMERGENCY MEDICINE

## 2022-01-01 PROCEDURE — C9113 INJ PANTOPRAZOLE SODIUM, VIA: HCPCS | Performed by: INTERNAL MEDICINE

## 2022-01-01 PROCEDURE — 36600 WITHDRAWAL OF ARTERIAL BLOOD: CPT

## 2022-01-01 PROCEDURE — 85025 COMPLETE CBC W/AUTO DIFF WBC: CPT

## 2022-01-01 PROCEDURE — 74011250636 HC RX REV CODE- 250/636: Performed by: FAMILY MEDICINE

## 2022-01-01 PROCEDURE — 74011250636 HC RX REV CODE- 250/636: Performed by: INTERNAL MEDICINE

## 2022-01-01 PROCEDURE — 81001 URINALYSIS AUTO W/SCOPE: CPT

## 2022-01-01 PROCEDURE — 82803 BLOOD GASES ANY COMBINATION: CPT

## 2022-01-01 PROCEDURE — 74011250637 HC RX REV CODE- 250/637: Performed by: INTERNAL MEDICINE

## 2022-01-01 PROCEDURE — 99223 1ST HOSP IP/OBS HIGH 75: CPT | Performed by: INTERNAL MEDICINE

## 2022-01-01 PROCEDURE — 86580 TB INTRADERMAL TEST: CPT | Performed by: INTERNAL MEDICINE

## 2022-01-01 PROCEDURE — 5A09357 ASSISTANCE WITH RESPIRATORY VENTILATION, LESS THAN 24 CONSECUTIVE HOURS, CONTINUOUS POSITIVE AIRWAY PRESSURE: ICD-10-PCS | Performed by: FAMILY MEDICINE

## 2022-01-01 PROCEDURE — 74011250637 HC RX REV CODE- 250/637: Performed by: EMERGENCY MEDICINE

## 2022-01-01 PROCEDURE — 87040 BLOOD CULTURE FOR BACTERIA: CPT

## 2022-01-01 PROCEDURE — 65610000001 HC ROOM ICU GENERAL

## 2022-01-01 PROCEDURE — 86140 C-REACTIVE PROTEIN: CPT

## 2022-01-01 PROCEDURE — 77030034850

## 2022-01-01 PROCEDURE — 2709999900 HC NON-CHARGEABLE SUPPLY

## 2022-01-01 PROCEDURE — 93005 ELECTROCARDIOGRAM TRACING: CPT | Performed by: EMERGENCY MEDICINE

## 2022-01-01 PROCEDURE — 84145 PROCALCITONIN (PCT): CPT

## 2022-01-01 PROCEDURE — 77030040829 HC CATH EXT URINE MDII -B

## 2022-01-01 RX ORDER — ONDANSETRON 2 MG/ML
4 INJECTION INTRAMUSCULAR; INTRAVENOUS
Status: CANCELLED | OUTPATIENT
Start: 2022-01-01

## 2022-01-01 RX ORDER — SODIUM CHLORIDE 9 MG/ML
250 INJECTION, SOLUTION INTRAVENOUS ONCE
Status: COMPLETED | OUTPATIENT
Start: 2022-01-01 | End: 2022-01-01

## 2022-01-01 RX ORDER — POLYETHYLENE GLYCOL 3350 17 G/17G
17 POWDER, FOR SOLUTION ORAL DAILY PRN
Status: DISCONTINUED | OUTPATIENT
Start: 2022-01-01 | End: 2022-01-01 | Stop reason: HOSPADM

## 2022-01-01 RX ORDER — SODIUM CHLORIDE 0.9 % (FLUSH) 0.9 %
5-10 SYRINGE (ML) INJECTION AS NEEDED
Status: DISCONTINUED | OUTPATIENT
Start: 2022-01-01 | End: 2022-01-01

## 2022-01-01 RX ORDER — PANTOPRAZOLE SODIUM 40 MG/1
40 TABLET, DELAYED RELEASE ORAL
Status: DISCONTINUED | OUTPATIENT
Start: 2022-01-01 | End: 2022-01-01 | Stop reason: HOSPADM

## 2022-01-01 RX ORDER — SODIUM CHLORIDE 0.9 % (FLUSH) 0.9 %
5-40 SYRINGE (ML) INJECTION AS NEEDED
Status: DISCONTINUED | OUTPATIENT
Start: 2022-01-01 | End: 2022-01-01

## 2022-01-01 RX ORDER — GUAIFENESIN 100 MG/5ML
81 LIQUID (ML) ORAL DAILY
Status: DISCONTINUED | OUTPATIENT
Start: 2022-01-01 | End: 2022-01-01

## 2022-01-01 RX ORDER — METOPROLOL SUCCINATE 50 MG/1
50 TABLET, EXTENDED RELEASE ORAL DAILY
Status: CANCELLED | OUTPATIENT
Start: 2022-01-01

## 2022-01-01 RX ORDER — PANTOPRAZOLE SODIUM 40 MG/1
40 TABLET, DELAYED RELEASE ORAL
Status: DISCONTINUED | OUTPATIENT
Start: 2022-01-01 | End: 2022-01-01

## 2022-01-01 RX ORDER — LATANOPROST 50 UG/ML
1 SOLUTION/ DROPS OPHTHALMIC EVERY EVENING
Status: DISCONTINUED | OUTPATIENT
Start: 2022-01-01 | End: 2022-01-01 | Stop reason: HOSPADM

## 2022-01-01 RX ORDER — PANTOPRAZOLE SODIUM 40 MG/1
40 TABLET, DELAYED RELEASE ORAL
Status: CANCELLED | OUTPATIENT
Start: 2022-01-01

## 2022-01-01 RX ORDER — GUAIFENESIN/DEXTROMETHORPHAN 100-10MG/5
5 SYRUP ORAL
Status: DISCONTINUED | OUTPATIENT
Start: 2022-01-01 | End: 2022-01-01

## 2022-01-01 RX ORDER — METOPROLOL SUCCINATE 25 MG/1
50 TABLET, EXTENDED RELEASE ORAL DAILY
Status: DISCONTINUED | OUTPATIENT
Start: 2022-01-01 | End: 2022-01-01

## 2022-01-01 RX ORDER — DEXAMETHASONE SODIUM PHOSPHATE 100 MG/10ML
6 INJECTION INTRAMUSCULAR; INTRAVENOUS
Status: COMPLETED | OUTPATIENT
Start: 2022-01-01 | End: 2022-01-01

## 2022-01-01 RX ORDER — SODIUM CHLORIDE 0.9 % (FLUSH) 0.9 %
5-40 SYRINGE (ML) INJECTION EVERY 8 HOURS
Status: DISCONTINUED | OUTPATIENT
Start: 2022-01-01 | End: 2022-01-01 | Stop reason: HOSPADM

## 2022-01-01 RX ORDER — LATANOPROST 50 UG/ML
1 SOLUTION/ DROPS OPHTHALMIC EVERY EVENING
Status: DISCONTINUED | OUTPATIENT
Start: 2022-01-01 | End: 2022-01-01

## 2022-01-01 RX ORDER — DOPAMINE HYDROCHLORIDE 320 MG/100ML
INJECTION, SOLUTION INTRAVENOUS
Status: DISCONTINUED
Start: 2022-01-01 | End: 2022-01-01 | Stop reason: HOSPADM

## 2022-01-01 RX ORDER — METOPROLOL TARTRATE 25 MG/1
25 TABLET, FILM COATED ORAL DAILY
COMMUNITY

## 2022-01-01 RX ORDER — DEXAMETHASONE SODIUM PHOSPHATE 100 MG/10ML
6 INJECTION INTRAMUSCULAR; INTRAVENOUS EVERY 24 HOURS
Status: DISCONTINUED | OUTPATIENT
Start: 2022-01-01 | End: 2022-01-01

## 2022-01-01 RX ORDER — LORAZEPAM 2 MG/ML
1 INJECTION INTRAMUSCULAR
Status: DISCONTINUED | OUTPATIENT
Start: 2022-01-01 | End: 2022-01-01 | Stop reason: HOSPADM

## 2022-01-01 RX ORDER — ACETAMINOPHEN 650 MG/1
650 SUPPOSITORY RECTAL
Status: DISCONTINUED | OUTPATIENT
Start: 2022-01-01 | End: 2022-01-01

## 2022-01-01 RX ORDER — DORZOLAMIDE HYDROCHLORIDE AND TIMOLOL MALEATE 20; 5 MG/ML; MG/ML
1 SOLUTION/ DROPS OPHTHALMIC DAILY
Status: CANCELLED | OUTPATIENT
Start: 2022-01-01

## 2022-01-01 RX ORDER — HEPARIN SODIUM 5000 [USP'U]/ML
5000 INJECTION, SOLUTION INTRAVENOUS; SUBCUTANEOUS EVERY 8 HOURS
Status: DISCONTINUED | OUTPATIENT
Start: 2022-01-01 | End: 2022-01-01

## 2022-01-01 RX ORDER — POLYETHYLENE GLYCOL 3350 17 G/17G
17 POWDER, FOR SOLUTION ORAL DAILY PRN
Status: DISCONTINUED | OUTPATIENT
Start: 2022-01-01 | End: 2022-01-01

## 2022-01-01 RX ORDER — SODIUM CHLORIDE 9 MG/ML
75 INJECTION, SOLUTION INTRAVENOUS CONTINUOUS
Status: CANCELLED | OUTPATIENT
Start: 2022-01-01

## 2022-01-01 RX ORDER — POLYETHYLENE GLYCOL 3350 17 G/17G
17 POWDER, FOR SOLUTION ORAL DAILY PRN
Status: CANCELLED | OUTPATIENT
Start: 2022-01-01

## 2022-01-01 RX ORDER — METOPROLOL TARTRATE 25 MG/1
25 TABLET, FILM COATED ORAL DAILY
Status: DISCONTINUED | OUTPATIENT
Start: 2022-01-01 | End: 2022-01-01

## 2022-01-01 RX ORDER — SODIUM CHLORIDE 0.9 % (FLUSH) 0.9 %
5-40 SYRINGE (ML) INJECTION EVERY 8 HOURS
Status: DISCONTINUED | OUTPATIENT
Start: 2022-01-01 | End: 2022-01-01

## 2022-01-01 RX ORDER — BENZONATATE 100 MG/1
100 CAPSULE ORAL
Status: DISCONTINUED | OUTPATIENT
Start: 2022-01-01 | End: 2022-01-01 | Stop reason: HOSPADM

## 2022-01-01 RX ORDER — SODIUM CHLORIDE 0.9 % (FLUSH) 0.9 %
5-40 SYRINGE (ML) INJECTION AS NEEDED
Status: DISCONTINUED | OUTPATIENT
Start: 2022-01-01 | End: 2022-01-01 | Stop reason: HOSPADM

## 2022-01-01 RX ORDER — SODIUM CHLORIDE 9 MG/ML
75 INJECTION, SOLUTION INTRAVENOUS CONTINUOUS
Status: DISCONTINUED | OUTPATIENT
Start: 2022-01-01 | End: 2022-01-01

## 2022-01-01 RX ORDER — GUAIFENESIN 100 MG/5ML
81 LIQUID (ML) ORAL DAILY
Status: CANCELLED | OUTPATIENT
Start: 2022-01-01

## 2022-01-01 RX ORDER — CITALOPRAM 10 MG/1
10 TABLET ORAL DAILY
Status: DISCONTINUED | OUTPATIENT
Start: 2022-01-01 | End: 2022-01-01 | Stop reason: HOSPADM

## 2022-01-01 RX ORDER — ACETAMINOPHEN 325 MG/1
650 TABLET ORAL
Status: DISCONTINUED | OUTPATIENT
Start: 2022-01-01 | End: 2022-01-01

## 2022-01-01 RX ORDER — ACETAMINOPHEN 650 MG/1
650 SUPPOSITORY RECTAL
Status: DISCONTINUED | OUTPATIENT
Start: 2022-01-01 | End: 2022-01-01 | Stop reason: HOSPADM

## 2022-01-01 RX ORDER — CITALOPRAM 10 MG/1
10 TABLET ORAL DAILY
Status: DISCONTINUED | OUTPATIENT
Start: 2022-01-01 | End: 2022-01-01

## 2022-01-01 RX ORDER — SODIUM CHLORIDE 9 MG/ML
75 INJECTION, SOLUTION INTRAVENOUS CONTINUOUS
Status: DISCONTINUED | OUTPATIENT
Start: 2022-01-01 | End: 2022-01-01 | Stop reason: HOSPADM

## 2022-01-01 RX ORDER — METOPROLOL SUCCINATE 50 MG/1
50 TABLET, EXTENDED RELEASE ORAL DAILY
Status: DISCONTINUED | OUTPATIENT
Start: 2022-01-01 | End: 2022-01-01

## 2022-01-01 RX ORDER — SODIUM CHLORIDE 0.9 % (FLUSH) 0.9 %
5-40 SYRINGE (ML) INJECTION EVERY 8 HOURS
Status: CANCELLED | OUTPATIENT
Start: 2022-01-01

## 2022-01-01 RX ORDER — ONDANSETRON 8 MG/1
4 TABLET, ORALLY DISINTEGRATING ORAL
Status: DISCONTINUED | OUTPATIENT
Start: 2022-01-01 | End: 2022-01-01 | Stop reason: HOSPADM

## 2022-01-01 RX ORDER — ONDANSETRON 4 MG/1
4 TABLET, ORALLY DISINTEGRATING ORAL
Status: DISCONTINUED | OUTPATIENT
Start: 2022-01-01 | End: 2022-01-01

## 2022-01-01 RX ORDER — ONDANSETRON 2 MG/ML
4 INJECTION INTRAMUSCULAR; INTRAVENOUS
Status: DISCONTINUED | OUTPATIENT
Start: 2022-01-01 | End: 2022-01-01

## 2022-01-01 RX ORDER — GUAIFENESIN/DEXTROMETHORPHAN 100-10MG/5
5 SYRUP ORAL
Status: CANCELLED | OUTPATIENT
Start: 2022-01-01

## 2022-01-01 RX ORDER — MORPHINE SULFATE 2 MG/ML
2 INJECTION, SOLUTION INTRAMUSCULAR; INTRAVENOUS
Status: DISCONTINUED | OUTPATIENT
Start: 2022-01-01 | End: 2022-01-01 | Stop reason: HOSPADM

## 2022-01-01 RX ORDER — METOPROLOL SUCCINATE 50 MG/1
50 TABLET, EXTENDED RELEASE ORAL DAILY
Status: DISCONTINUED | OUTPATIENT
Start: 2022-01-01 | End: 2022-01-01 | Stop reason: HOSPADM

## 2022-01-01 RX ORDER — SODIUM CHLORIDE 0.9 % (FLUSH) 0.9 %
5-10 SYRINGE (ML) INJECTION AS NEEDED
Status: DISCONTINUED | OUTPATIENT
Start: 2022-01-01 | End: 2022-01-01 | Stop reason: HOSPADM

## 2022-01-01 RX ORDER — SODIUM CHLORIDE 0.9 % (FLUSH) 0.9 %
5-40 SYRINGE (ML) INJECTION AS NEEDED
Status: CANCELLED | OUTPATIENT
Start: 2022-01-01

## 2022-01-01 RX ORDER — ACETAMINOPHEN 325 MG/1
650 TABLET ORAL
Status: DISCONTINUED | OUTPATIENT
Start: 2022-01-01 | End: 2022-01-01 | Stop reason: HOSPADM

## 2022-01-01 RX ORDER — ACETAMINOPHEN 650 MG/1
650 SUPPOSITORY RECTAL
Status: CANCELLED | OUTPATIENT
Start: 2022-01-01

## 2022-01-01 RX ORDER — SODIUM CHLORIDE, SODIUM LACTATE, POTASSIUM CHLORIDE, CALCIUM CHLORIDE 600; 310; 30; 20 MG/100ML; MG/100ML; MG/100ML; MG/100ML
1000 INJECTION, SOLUTION INTRAVENOUS ONCE
Status: COMPLETED | OUTPATIENT
Start: 2022-01-01 | End: 2022-01-01

## 2022-01-01 RX ORDER — LORAZEPAM 2 MG/ML
1 INJECTION INTRAMUSCULAR
Status: DISCONTINUED | OUTPATIENT
Start: 2022-01-01 | End: 2022-01-01

## 2022-01-01 RX ORDER — DEXMEDETOMIDINE HYDROCHLORIDE 4 UG/ML
.1-1.5 INJECTION, SOLUTION INTRAVENOUS
Status: DISCONTINUED | OUTPATIENT
Start: 2022-01-01 | End: 2022-01-01

## 2022-01-01 RX ORDER — DOPAMINE HYDROCHLORIDE 320 MG/100ML
0-50 INJECTION, SOLUTION INTRAVENOUS
Status: DISCONTINUED | OUTPATIENT
Start: 2022-01-01 | End: 2022-01-01

## 2022-01-01 RX ORDER — DEXAMETHASONE SODIUM PHOSPHATE 100 MG/10ML
6 INJECTION INTRAMUSCULAR; INTRAVENOUS EVERY 24 HOURS
Status: DISCONTINUED | OUTPATIENT
Start: 2022-01-01 | End: 2022-01-01 | Stop reason: HOSPADM

## 2022-01-01 RX ORDER — LORAZEPAM 2 MG/ML
1 CONCENTRATE ORAL
Status: DISCONTINUED | OUTPATIENT
Start: 2022-01-01 | End: 2022-01-01 | Stop reason: HOSPADM

## 2022-01-01 RX ORDER — ACETAMINOPHEN 325 MG/1
650 TABLET ORAL
Status: CANCELLED | OUTPATIENT
Start: 2022-01-01

## 2022-01-01 RX ORDER — ONDANSETRON 2 MG/ML
4 INJECTION INTRAMUSCULAR; INTRAVENOUS
Status: DISCONTINUED | OUTPATIENT
Start: 2022-01-01 | End: 2022-01-01 | Stop reason: HOSPADM

## 2022-01-01 RX ORDER — CITALOPRAM 10 MG/1
10 TABLET ORAL DAILY
Status: CANCELLED | OUTPATIENT
Start: 2022-01-01

## 2022-01-01 RX ORDER — LATANOPROST 50 UG/ML
1 SOLUTION/ DROPS OPHTHALMIC EVERY EVENING
Status: CANCELLED | OUTPATIENT
Start: 2022-01-01

## 2022-01-01 RX ORDER — DORZOLAMIDE HYDROCHLORIDE AND TIMOLOL MALEATE 20; 5 MG/ML; MG/ML
1 SOLUTION/ DROPS OPHTHALMIC DAILY
Status: DISCONTINUED | OUTPATIENT
Start: 2022-01-01 | End: 2022-01-01 | Stop reason: HOSPADM

## 2022-01-01 RX ORDER — ONDANSETRON 4 MG/1
4 TABLET, ORALLY DISINTEGRATING ORAL
Status: DISCONTINUED | OUTPATIENT
Start: 2022-01-01 | End: 2022-01-01 | Stop reason: HOSPADM

## 2022-01-01 RX ORDER — BENZONATATE 100 MG/1
100 CAPSULE ORAL
Status: DISCONTINUED | OUTPATIENT
Start: 2022-01-01 | End: 2022-01-01

## 2022-01-01 RX ORDER — ONDANSETRON 4 MG/1
4 TABLET, ORALLY DISINTEGRATING ORAL
Status: CANCELLED | OUTPATIENT
Start: 2022-01-01

## 2022-01-01 RX ORDER — DORZOLAMIDE HYDROCHLORIDE AND TIMOLOL MALEATE 20; 5 MG/ML; MG/ML
1 SOLUTION/ DROPS OPHTHALMIC DAILY
Status: DISCONTINUED | OUTPATIENT
Start: 2022-01-01 | End: 2022-01-01

## 2022-01-01 RX ORDER — SODIUM CHLORIDE 0.9 % (FLUSH) 0.9 %
5-10 SYRINGE (ML) INJECTION AS NEEDED
Status: CANCELLED | OUTPATIENT
Start: 2022-01-01

## 2022-01-01 RX ORDER — BENZONATATE 100 MG/1
100 CAPSULE ORAL
Status: CANCELLED | OUTPATIENT
Start: 2022-01-01

## 2022-01-01 RX ORDER — DEXAMETHASONE SODIUM PHOSPHATE 100 MG/10ML
6 INJECTION INTRAMUSCULAR; INTRAVENOUS EVERY 24 HOURS
Status: CANCELLED | OUTPATIENT
Start: 2022-01-01 | End: 2022-01-27

## 2022-01-01 RX ORDER — ATROPINE SULFATE 10 MG/ML
3 SOLUTION/ DROPS OPHTHALMIC
Status: DISCONTINUED | OUTPATIENT
Start: 2022-01-01 | End: 2022-01-01 | Stop reason: HOSPADM

## 2022-01-01 RX ORDER — CITALOPRAM 20 MG/1
10 TABLET, FILM COATED ORAL DAILY
Status: DISCONTINUED | OUTPATIENT
Start: 2022-01-01 | End: 2022-01-01

## 2022-01-01 RX ORDER — ACETAMINOPHEN 325 MG/1
650 TABLET ORAL
Status: COMPLETED | OUTPATIENT
Start: 2022-01-01 | End: 2022-01-01

## 2022-01-01 RX ORDER — GUAIFENESIN/DEXTROMETHORPHAN 100-10MG/5
5 SYRUP ORAL
Status: DISCONTINUED | OUTPATIENT
Start: 2022-01-01 | End: 2022-01-01 | Stop reason: HOSPADM

## 2022-01-01 RX ORDER — GUAIFENESIN 100 MG/5ML
81 LIQUID (ML) ORAL DAILY
Status: DISCONTINUED | OUTPATIENT
Start: 2022-01-01 | End: 2022-01-01 | Stop reason: HOSPADM

## 2022-01-01 RX ORDER — NYSTATIN 100000 [USP'U]/G
POWDER TOPICAL AS NEEDED
Status: DISCONTINUED | OUTPATIENT
Start: 2022-01-01 | End: 2022-01-01 | Stop reason: HOSPADM

## 2022-01-01 RX ADMIN — SODIUM CHLORIDE, PRESERVATIVE FREE 10 ML: 5 INJECTION INTRAVENOUS at 05:36

## 2022-01-01 RX ADMIN — TUBERCULIN PURIFIED PROTEIN DERIVATIVE 5 UNITS: 5 INJECTION, SOLUTION INTRADERMAL at 16:29

## 2022-01-01 RX ADMIN — ACETAMINOPHEN 650 MG: 325 TABLET, FILM COATED ORAL at 11:52

## 2022-01-01 RX ADMIN — SODIUM CHLORIDE, SODIUM LACTATE, POTASSIUM CHLORIDE, AND CALCIUM CHLORIDE 1000 ML: 600; 310; 30; 20 INJECTION, SOLUTION INTRAVENOUS at 11:52

## 2022-01-01 RX ADMIN — SODIUM CHLORIDE 250 ML: 900 INJECTION, SOLUTION INTRAVENOUS at 08:48

## 2022-01-01 RX ADMIN — LORAZEPAM 1 MG: 2 INJECTION INTRAMUSCULAR; INTRAVENOUS at 02:53

## 2022-01-01 RX ADMIN — DOPAMINE HYDROCHLORIDE 5 MCG/KG/MIN: 320 INJECTION, SOLUTION INTRAVENOUS at 09:04

## 2022-01-01 RX ADMIN — DEXMEDETOMIDINE HYDROCHLORIDE 0.4 MCG/KG/HR: 400 INJECTION INTRAVENOUS at 08:37

## 2022-01-01 RX ADMIN — SODIUM CHLORIDE 40 MG: 9 INJECTION INTRAMUSCULAR; INTRAVENOUS; SUBCUTANEOUS at 09:59

## 2022-01-01 RX ADMIN — PIPERACILLIN AND TAZOBACTAM 4.5 G: 4; .5 INJECTION, POWDER, FOR SOLUTION INTRAVENOUS at 12:44

## 2022-01-01 RX ADMIN — DEXAMETHASONE SODIUM PHOSPHATE 6 MG: 10 INJECTION INTRAMUSCULAR; INTRAVENOUS at 08:36

## 2022-01-01 RX ADMIN — BENZONATATE 100 MG: 100 CAPSULE ORAL at 20:09

## 2022-01-01 RX ADMIN — LORAZEPAM 1 MG: 2 INJECTION INTRAMUSCULAR; INTRAVENOUS at 05:45

## 2022-01-01 RX ADMIN — LORAZEPAM 1 MG: 2 INJECTION INTRAMUSCULAR; INTRAVENOUS at 12:31

## 2022-01-01 RX ADMIN — SODIUM CHLORIDE 75 ML/HR: 900 INJECTION, SOLUTION INTRAVENOUS at 09:59

## 2022-01-01 RX ADMIN — SODIUM CHLORIDE 75 ML/HR: 900 INJECTION, SOLUTION INTRAVENOUS at 02:52

## 2022-01-01 RX ADMIN — MORPHINE SULFATE 2 MG: 2 INJECTION, SOLUTION INTRAMUSCULAR; INTRAVENOUS at 11:45

## 2022-01-01 RX ADMIN — LORAZEPAM 1 MG: 2 INJECTION INTRAMUSCULAR; INTRAVENOUS at 11:45

## 2022-01-01 RX ADMIN — SODIUM CHLORIDE 1000 ML: 900 INJECTION, SOLUTION INTRAVENOUS at 20:29

## 2022-01-01 RX ADMIN — DORZOLAMIDE HYDROCHLORIDE AND TIMOLOL MALEATE 1 DROP: 20; 5 SOLUTION/ DROPS OPHTHALMIC at 09:56

## 2022-01-01 RX ADMIN — SODIUM CHLORIDE 75 ML/HR: 900 INJECTION, SOLUTION INTRAVENOUS at 16:42

## 2022-01-01 RX ADMIN — DEXAMETHASONE SODIUM PHOSPHATE 6 MG: 10 INJECTION INTRAMUSCULAR; INTRAVENOUS at 14:28

## 2022-01-01 RX ADMIN — MORPHINE SULFATE 2 MG: 2 INJECTION, SOLUTION INTRAMUSCULAR; INTRAVENOUS at 12:31

## 2022-01-17 PROBLEM — N17.9 AKI (ACUTE KIDNEY INJURY) (HCC): Status: ACTIVE | Noted: 2022-01-01

## 2022-01-17 PROBLEM — J96.01 ACUTE HYPOXEMIC RESPIRATORY FAILURE DUE TO COVID-19 (HCC): Status: ACTIVE | Noted: 2022-01-01

## 2022-01-17 PROBLEM — U07.1 ACUTE HYPOXEMIC RESPIRATORY FAILURE DUE TO COVID-19 (HCC): Status: ACTIVE | Noted: 2022-01-01

## 2022-01-17 PROBLEM — R79.1 SUPRATHERAPEUTIC INR: Status: ACTIVE | Noted: 2022-01-01

## 2022-01-17 PROBLEM — R00.0 SINUS TACHYCARDIA: Status: ACTIVE | Noted: 2022-01-01

## 2022-01-17 PROBLEM — A41.9 SEPSIS (HCC): Status: ACTIVE | Noted: 2022-01-01

## 2022-01-17 PROBLEM — R50.9 FEVER: Status: ACTIVE | Noted: 2022-01-01

## 2022-01-17 PROBLEM — D72.829 LEUKOCYTOSIS: Status: ACTIVE | Noted: 2022-01-01

## 2022-01-17 PROBLEM — J18.9 BILATERAL PNEUMONIA: Status: ACTIVE | Noted: 2022-01-01

## 2022-01-17 NOTE — ED PROVIDER NOTES
Patient brought in by EMS from nursing facility. Due to some altered mental status and shortness of breath, difficulty get any history from her. She denies to me any pain. States her main issue is shortness of breath. She has had no vomiting. Denies to me any fever or chills. Patient has had some cough congestion shortness of breath over the last few days. Evidently tested COVID-positive within the last week or so. She usually is not on oxygen was placed on 4 L of oxygen at the facility. Her shortness of breath seem to get worse in the last day or so and EMS was called. They found her O2 sat to be in the 70s. Placed on high flow O2 with some improvement of her saturation. Review of records reveals the patient has atrial fibrillation and is on Coumadin. Sleep apnea as well. Is had cholecystectomy also. The history is provided by the patient and the EMS personnel. Shortness of Breath  This is a new problem. The current episode started more than 2 days ago. The problem has been gradually worsening. Associated symptoms include cough. Pertinent negatives include no fever, no sputum production, no hemoptysis, no chest pain, no vomiting, no abdominal pain, no leg pain and no leg swelling. Associated medical issues include COPD (Sleep apnea). Associated medical issues do not include PE, heart failure or DVT.         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 Leo Boateng and Macular degeneration    Hammer toes, bilateral     Dr. Keegan Salamanca Hx of mammogram 03/20/2014    negative at [facility] Hyperlipidemia     Insomnia, unspecified 7/16/2015    Morbid obesity ([address] 7/16/2015    Restless leg syndrome     Unspecified essential hypertension 7/16/2015    Unspecified sleep apnea 07/16/2015    has CPAP machine but does not use       Past Surgical History:   Procedure Laterality Date    HX APPENDECTOMY      HX CHOLECYSTECTOMY      HX COLONOSCOPY  09/11/2008    tubular adenoma polyp,Diverticulosis-    HX GYN      S/P BTL    HX HEENT      T+A    HX ORTHOPAEDIC      right knee and left knee         History reviewed. No pertinent family history. Social History     Socioeconomic History    Marital status:      Spouse name: Not on file    Number of children: Not on file    Years of education: Not on file    Highest education level: Not on file   Occupational History    Not on file   Tobacco Use    Smoking status: Never Smoker    Smokeless tobacco: Never Used   Substance and Sexual Activity    Alcohol use: No     Alcohol/week: 0.0 standard drinks    Drug use: No    Sexual activity: Not on file   Other Topics Concern    Not on file   Social History Narrative    Not on file     Social Determinants of Health     Financial Resource Strain:     Difficulty of Paying Living Expenses: Not on file   Food Insecurity:     Worried About Running Out of Food in the Last Year: Not on file    Riley of Food in the Last Year: Not on file   Transportation Needs:     Lack of Transportation (Medical): Not on file    Lack of Transportation (Non-Medical):  Not on file   Physical Activity:     Days of Exercise per Week: Not on file    Minutes of Exercise per Session: Not on file   Stress:     Feeling of Stress : Not on file   Social Connections:     Frequency of Communication with Friends and Family: Not on file    Frequency of Social Gatherings with Friends and Family: Not on file    Attends Jewish Services: Not on file    Active Member of Clubs or Organizations: Not on file    Attends Club or Organization Meetings: Not on file    Marital Status: Not on file   Intimate Partner Violence:     Fear of Current or Ex-Partner: Not on file    Emotionally Abused: Not on file    Physically Abused: Not on file    Sexually Abused: Not on file   Housing Stability:     Unable to Pay for Housing in the Last Year: Not on file    Number of [place] in the Last Year: Not on file    Unstable Housing in the Last Year: Not on file         ALLERGIES: Benadryl [diphenhydramine hcl], Melatonin, and Sulfa (sulfonamide antibiotics)    Review of Systems   Unable to perform ROS: Severe respiratory distress   Constitutional: Positive for fatigue. Negative for chills and fever. Respiratory: Positive for cough and shortness of breath. Negative for hemoptysis and sputum production. Cardiovascular: Negative for chest pain and leg swelling. Gastrointestinal: Negative for abdominal pain and vomiting. Vitals:    01/17/22 1128 01/17/22 1132   BP:  119/75   Temp: (!) 102.9 °F (39.4 °C)    SpO2:  91%   Weight: 124.7 kg (275 lb)    Height: 5' 1\" (1.549 m)             Physical Exam  Vitals and nursing note reviewed. Constitutional:       General: She is not in acute distress. Appearance: She is well-developed. HENT:      Head: Normocephalic and atraumatic. Right Ear: External ear normal.      Left Ear: External ear normal.      Mouth/Throat:      Pharynx: No oropharyngeal exudate. Eyes:      General: No scleral icterus. Conjunctiva/sclera: Conjunctivae normal.   Cardiovascular:      Rate and Rhythm: Normal rate and regular rhythm. Heart sounds: No murmur heard. Pulmonary:      Effort: No respiratory distress. Breath sounds: Rhonchi present. No wheezing. Abdominal:      General: Bowel sounds are normal.      Palpations: Abdomen is soft. There is no mass. Tenderness: There is no abdominal tenderness. There is no guarding or rebound. Hernia: No hernia is present. Skin:     General: Skin is warm and dry. Neurological:      Mental Status: She is alert. Comments: Slightly slurred speech. Patient somewhat confused. Oriented x1. No focal weakness   Psychiatric:         Speech: Speech normal.          MDM  Number of Diagnoses or Management Options  Diagnosis management comments: Check ABG for CO2 retention patient febrile. Screen for sepsis and bacterial pneumonia. Patient hypoxemic. Involve respiratory therapy for best O2 provision. Probably will need air Vo. Currently on nonrebreather. Amount and/or Complexity of Data Reviewed  Clinical lab tests: ordered and reviewed  Tests in the radiology section of CPT®: ordered and reviewed  Tests in the medicine section of CPT®: ordered and reviewed  Review and summarize past medical records: yes  Discuss the patient with other providers: yes  Independent visualization of images, tracings, or specimens: yes    Risk of Complications, Morbidity, and/or Mortality  Presenting problems: moderate  Diagnostic procedures: minimal  Management options: low    Patient Progress  Patient progress: stable         Procedures    1/17/2022  XR CHEST PORT    Result Date: 1/17/2022  Chest portable CLINICAL INDICATION: Sepsis, worsening moderate to severe shortness of breath and weakness for about one week, Covid-19 positive COMPARISON: None TECHNIQUE: single AP portable view chest at 11:43 AM semiupright FINDINGS: Cardiac silhouette is mildly enlarged. Lung volumes are shallow leading to crowding and patient is rotated to the left. There is some artifact due to oxygen equipment overlying the upper chest. No evidence of a pneumothorax, dense lobar consolidation, or prominent pleural effusion. There may be a trace pleural effusion at the left costophrenic sulcus. Moderate multifocal bilateral groundglass and reticular infiltrates involving all lobes, right slightly greater than left. Bone density appears low. There are chronic degenerative changes of both shoulders, not unusual for age.      Multifocal bilateral infiltrates, nonspecific but would be compatible with viral pneumonia. Will discuss with hospitalist regarding admission. Patient is maintaining on high flow O2. Antibiotics ordered, Decadron ordered cultures done    CRITICAL CARE Documentation: This patient is critically ill and there is a high probability of of imminent or life threatening deterioration in the patient's condition without immediate management. The nature of the patient's clinical problem is: Hypoxemia, COVID infection, suspect sepsis    I have spent 1 hour in direct patient care, documentation, review of labs/xrays/old records, discussion with Colleague . The time involved in the performance of separately reportable procedures was not counted toward critical care time. Dyan Denver, MD; 1/17/2022 @1:53 PM       Due to superimposed COVID, will not give 30 mL/kg bolus.     Vitals:    01/17/22 1128 01/17/22 1132 01/17/22 1136 01/17/22 1221   BP:  119/75 119/75    Pulse:   (!) 110 (!) 101   Temp: (!) 102.9 °F (39.4 °C)      SpO2:  91% 90% 93%

## 2022-01-17 NOTE — H&P
Hospitalist History and Physical   Admit Date:  2022 11:16 AM   Name:  Misti Barron. Cole Davey   Age:  80 y.o. Sex:  female  :  1932   MRN:  [id number]   Room:  [place] [address]    Presenting Complaint: Positive For Covid-19 and Shortness of Breath    Reason(s) for Admission: No admission diagnoses are documented for this encounter. History of Present Illness:   Misti BarronZuly Davey is a 80 y.o. female with a h/o morbid obesity, HTN, HLD, atrial fibrillation on warfarin, GERD, glaucoma who was sent in from [facility] for hypoxia. She was diagnosed with COVID approximately 1 week ago. Today she was apparently found to have a RA O2 saturation of 76% so she was brought here. She is a poor historian and does not provide a trustworthy ROS. Says she is not currently SOB and denies chest pain, N/V/D, palpitations, headaches, syncope. She is not vaccinated against COVID-19. She was initially ok on 6L then required NRB. She was febrile with Tm 102.9. WBCs elevated, mild MARLEY, ABG with mild respiratory acidosis (but patient did not tolerate Bipap), INR 5.3. CXR shows bilateral infiltrates. She was given dexamethasone and antibiotics and we are consulted for admission and further management. Review of Systems:  10 systems reviewed and negative except as noted in HPI. Assessment & Plan:   # Acute hypoxemic respiratory failure, sepsis and bilateral pneumonia 2/2 COVID-19   - RA sat prior to presentation was in the 70s. Initially improved on 6L then escalated to NRB and now Airvo max settings. Sats low 90s at rest. Attempted Bipap earlier due to hypercarbia but patient did not tolerate it. Con't Airvo, dexamethasone, renally dosed baricitinib. Supportive care otherwise. Transfer to [facility] pending. # MARLEY   - Gentle IVs, hold diuretics. BMP tomorrow. # Supratherapeutic INR   - Hold warfarin, check INR tomorrow. Change put back on warfarin and stop SQ heparin when INR has improved.     # HTN   - Holding diuretics with MARLEY.    # Atrial fibrillation   - Con't BB, hold warfarin with INR 5.3    # Morbid obesity    There is a high probability of acute organ impairment or life-threatening deterioration in the patient's condition from severe sepsis, acute hypoxemic respiratory failure and bilateral pneumonia 2/2 COVID-19. Critical care interventions: Airvo high flow oxygen at maximum settings  Total critical care time spent: 40 minutes. Time is indicative of direct patient attendance at bedside and on the patient's floor nearby. Includes time spent at bedside performing history and exam, performing chart review, discussing findings and treatment plan with patient and/or family, discussing patient with consultants and colleagues, ordering and reviewing pertinent laboratory and radiographic evaluations, and discussing patient with nursing staff. Time excludes procedures. Dispo/Discharge Planning: Transfer pending to [facility] COVID unit. Called and updated daughter, Javed Gutierrez at [phone]. Diet: No diet orders on file  VTE ppx: Heparin SQ  Code status: Discussed with patient and she requests to be DNR.     Hospital Problems as of 1/17/2022 Date Reviewed: 12/31/2020          Codes Class Noted - Resolved POA    Supratherapeutic INR ICD-10-CM: R79.1  ICD-9-CM: 790.92  1/17/2022 - Present Yes        * (Principal) Acute hypoxemic respiratory failure due to COVID-19 [facility]) ICD-10-CM: U07.1, J96.01  ICD-9-CM: 518.81, 079.89, 799.02  1/17/2022 - Present Yes        Bilateral pneumonia ICD-10-CM: J18.9  ICD-9-CM: 486  1/17/2022 - Present Unknown        MARLEY (acute kidney injury) ([facility] [address] ICD-10-CM: N17.9  ICD-9-CM: 584.9  1/17/2022 - Present Yes        Sepsis ([facility] ICD-10-CM: A41.9  ICD-9-CM: 038.9, 995.91  1/17/2022 - Present Yes        Fever ICD-10-CM: R50.9  ICD-9-CM: 780.60  1/17/2022 - Present Yes        Sinus tachycardia ICD-10-CM: R00.0  ICD-9-CM: 427.89  1/17/2022 - Present Yes        Leukocytosis ICD-10-CM: V40.107  ICD-9-CM: 288.60 1/17/2022 - Present Yes        Physical debility ICD-10-CM: R53.81  ICD-9-CM: 799.3  7/31/2020 - Present Yes        Gastroesophageal reflux disease without esophagitis ICD-10-CM: K21.9  ICD-9-CM: 530.81  10/17/2017 - Present Yes        Glaucoma ICD-10-CM: H40.9  ICD-9-CM: 365.9  10/17/2017 - Present Yes        Monitoring for long-term anticoagulant use ICD-10-CM: Z51.81, Z79.01  ICD-9-CM: V58.61  3/9/2016 - Present Yes        Atrial fibrillation ([facility] ICD-10-CM: I48.91  ICD-9-CM: 427.31  7/16/2015 - Present Yes        Morbid obesity ([facility] [address] ICD-10-CM: E66.01  ICD-9-CM: 278.01  7/16/2015 - Present Yes        Essential hypertension ICD-10-CM: I10  ICD-9-CM: 401.9  7/16/2015 - Present Yes        Sleep apnea ICD-10-CM: G47.30  ICD-9-CM: 780.57  7/16/2015 - Present Yes              Past History:  Past Medical History:   Diagnosis Date    Age-related cognitive decline     Arthritis     Atrial fibrillation ([facility] 7/16/2015    [facility]    Chronic infection of knee joint prosthesis ([facility] [address] 2019    right    Diverticula, colon     Generalized osteoarthrosis, unspecified site 7/16/2015    GERD (gastroesophageal reflux disease)     Glaucoma     Dr. Natalya Gonzalez and Macular degeneration    Hammer toes, bilateral     Dr. Duncan Catching Hx of mammogram 03/20/2014    negative at [facility] Hyperlipidemia     Insomnia, unspecified 7/16/2015    Morbid obesity ([facility] [address] 7/16/2015    Restless leg syndrome     Unspecified essential hypertension 7/16/2015    Unspecified sleep apnea 07/16/2015    has CPAP machine but does not use     Past Surgical History:   Procedure Laterality Date    HX APPENDECTOMY      HX CHOLECYSTECTOMY      HX COLONOSCOPY  09/11/2008    tubular adenoma polyp,Diverticulosis-    HX GYN      S/P BTL    HX HEENT      T+A    HX ORTHOPAEDIC      right knee and left knee      Allergies   Allergen Reactions    Benadryl [Diphenhydramine Hcl] Unknown (comments)    Melatonin Unknown (comments)    Sulfa (Sulfonamide Antibiotics) Unknown (comments)      Social History     Tobacco Use    Smoking status: Never Smoker    Smokeless tobacco: Never Used   Substance Use Topics    Alcohol use: No     Alcohol/week: 0.0 standard drinks      Family History   Problem Relation Age of Onset    No Known Problems Mother     No Known Problems Father       Family history reviewed and negative except as noted above. Immunization History   Administered Date(s) Administered    Influenza High Dose Vaccine PF 09/17/2018    Influenza Vaccine 10/15/2014    Influenza Vaccine (Quad) Mdck Pf (>2 Yrs Flucelvax QUAD 14080) 11/01/2017    Influenza Vaccine (Quad) PF (>6 Mo Flulaval, Fluarix, and >3 Yrs Afluria, Fluzone 51223) 10/26/2016    Influenza Vaccine (Tri) Adjuvanted (>65 Yrs FLUAD TRI 45397) 10/23/2019    Influenza, Quadrivalent, Adjuvanted (>65 Yrs FLUAD QUAD 12417) 09/16/2020    Pneumococcal Conjugate (PCV-13) 09/17/2018, 12/03/2019    Pneumococcal Vaccine (Unspecified Type) 03/09/2007     Prior to Admit Medications:  Current Outpatient Medications   Medication Instructions    aspirin 81 mg, Oral, DAILY    bimatoprost (LUMIGAN) 0.01 % ophthalmic drops Both Eyes, EVERY EVENING    cefadroxil (DURICEF) 500 mg, Oral, 2 TIMES DAILY    citalopram (CELEXA) 10 mg, Oral, DAILY    DORZOLAMIDE HCL/TIMOLOL MALEAT (COSOPT OP) Ophthalmic, gtts every day     ergocalciferol (ERGOCALCIFEROL) 1,250 mcg (50,000 unit) capsule TAKE 1 CAPSULE EVERY WEEK AS DIRECTED.  gabapentin (NEURONTIN) 300 mg capsule TAKE 1 CAPSULE BY MOUTH TWICE A DAY    latanoprost (XALATAN) 0.005 % ophthalmic solution As directed    metoprolol succinate (TOPROL-XL) 50 mg XL tablet TAKE 1 TABLET BY MOUTH DAILY    mupirocin (BACTROBAN) 2 % ointment Topical, DAILY    nystatin (MYCOSTATIN) powder Use on affected area as needed.     omeprazole (PRILOSEC) 20 mg capsule TAKE 1 CAPSULE DAILY    spironolactone (ALDACTONE) 25 mg tablet TAKE 1 TABLET BY MOUTH DAILY    triamterene-hydroCHLOROthiazide (MAXZIDE) 75-50 mg per tablet TAKE 1 TABLET BY MOUTH DAILY    warfarin (COUMADIN) 2.5 mg, Oral, DAILY    warfarin (COUMADIN) 5 mg, Oral, 5 X WEEK, 2.5mg on Wed and Sun and 5mg other nights       Objective:     Patient Vitals for the past 24 hrs:   Temp Pulse BP SpO2   01/17/22 1221  (!) 101  93 %   01/17/22 1136  (!) 110 119/75 90 %   01/17/22 1132   119/75 91 %   01/17/22 1128 (!) 102.9 °F (39.4 °C)        Oxygen Therapy  O2 Sat (%): 93 % (01/17/22 1221)  Pulse via Oximetry: 122 beats per minute (01/17/22 1221)    Estimated body mass index is 51.96 kg/m² as calculated from the following:    Height as of this encounter: 5' 1\" (1.549 m). Weight as of this encounter: 124.7 kg (275 lb). No intake or output data in the 24 hours ending 01/17/22 1616      Physical Exam:  Blood pressure 119/75, pulse (!) 101, temperature (!) 102.9 °F (39.4 °C), height 5' 1\" (1.549 m), weight 124.7 kg (275 lb), SpO2 93 %. General:    Well nourished. Morbidly obese. Conversational dyspnea. Head:  Normocephalic, atraumatic  Eyes:  Sclerae appear normal.  Pupils equally round. ENT:  Nares appear normal, no drainage. Moist oral mucosa  Neck:  No restricted ROM. Trachea midline   CV:   Tachycardia, reg rhythm. No m/r/g. No jugular venous distension. Lungs:   Clear anteriorly, likely some basilar rales but difficult to appreciate due to body habitus. Airvo 60L/100% with bedside sats 90% at rest. Conversational dyspnea. Abdomen: Bowel sounds present. Soft, nontender, nondistended. Extremities: No cyanosis or clubbing. Trace edema  Skin:     No rashes and normal coloration. Warm and dry. Neuro:  CN II-XII grossly intact. Sensation intact. A&Ox3  Psych:  Normal mood and affect.       I have reviewed ordered lab tests and independently visualized imaging below:    Last 24hr Labs:  Recent Results (from the past 24 hour(s))   PROCALCITONIN    Collection Time: 01/17/22 11:41 AM Result Value Ref Range    Procalcitonin 0.79 (H) 0.00 - 3.12 ng/mL   METABOLIC PANEL, COMPREHENSIVE    Collection Time: 01/17/22 11:41 AM   Result Value Ref Range    Sodium 134 (L) 136 - 145 mmol/L    Potassium 4.8 3.5 - 5.1 mmol/L    Chloride 97 (L) 98 - 107 mmol/L    CO2 30 21 - 32 mmol/L    Anion gap 7 7 - 16 mmol/L    Glucose 90 65 - 100 mg/dL    BUN 39 (H) 8 - 23 MG/DL    Creatinine 1.12 (H) 0.6 - 1.0 MG/DL    GFR est AA 59 (L) >60 ml/min/1.73m2    GFR est non-AA 49 (L) >60 ml/min/1.73m2    Calcium 9.2 8.3 - 10.4 MG/DL    Bilirubin, total 1.0 0.2 - 1.1 MG/DL    ALT (SGPT) 15 12 - 65 U/L    AST (SGOT) 30 15 - 37 U/L    Alk. phosphatase 123 50 - 130 U/L    Protein, total 6.5 6.3 - 8.2 g/dL    Albumin 2.2 (L) 3.2 - 4.6 g/dL    Globulin 4.3 (H) 2.3 - 3.5 g/dL    A-G Ratio 0.5 (L) 1.2 - 3.5     CBC WITH AUTOMATED DIFF    Collection Time: 01/17/22 11:41 AM   Result Value Ref Range    WBC 20.4 (H) 4.3 - 11.1 K/uL    RBC 5.15 4.05 - 5.2 M/uL    HGB 13.9 11.7 - 15.4 g/dL    HCT 44.1 35.8 - 46.3 %    MCV 85.6 79.6 - 97.8 FL    MCH 27.0 26.1 - 32.9 PG    MCHC 31.5 31.4 - 35.0 g/dL    RDW 16.1 (H) 11.9 - 14.6 %    PLATELET 803 516 - 825 K/uL    MPV 10.9 9.4 - 12.3 FL    ABSOLUTE NRBC 0.00 0.0 - 0.2 K/uL    DF AUTOMATED      NEUTROPHILS 93 (H) 43 - 78 %    LYMPHOCYTES 4 (L) 13 - 44 %    MONOCYTES 2 (L) 4.0 - 12.0 %    EOSINOPHILS 0 (L) 0.5 - 7.8 %    BASOPHILS 1 0.0 - 2.0 %    IMMATURE GRANULOCYTES 1 0.0 - 5.0 %    ABS. NEUTROPHILS 18.9 (H) 1.7 - 8.2 K/UL    ABS. LYMPHOCYTES 0.9 0.5 - 4.6 K/UL    ABS. MONOCYTES 0.3 0.1 - 1.3 K/UL    ABS. EOSINOPHILS 0.0 0.0 - 0.8 K/UL    ABS. BASOPHILS 0.1 0.0 - 0.2 K/UL    ABS. IMM.  GRANS. 0.2 0.0 - 0.5 K/UL   C REACTIVE PROTEIN, QT    Collection Time: 01/17/22 11:41 AM   Result Value Ref Range    C-Reactive protein 28.2 (H) 0.0 - 0.9 mg/dL   PROTHROMBIN TIME + INR    Collection Time: 01/17/22 11:42 AM   Result Value Ref Range    Prothrombin time 49.0 (H) 12.6 - 14.5 sec    INR 5.3 (HH) URINALYSIS W/ RFLX MICROSCOPIC    Collection Time: 01/17/22 11:59 AM   Result Value Ref Range    Color ORANGE      Appearance CLOUDY      Specific gravity 1.029 (H) 1.001 - 1.023      pH (UA) 5.0 5.0 - 9.0      Protein 30 (A) NEG mg/dL    Glucose Negative mg/dL    Ketone TRACE (A) NEG mg/dL    Bilirubin SMALL (A) NEG      Blood Negative NEG      Urobilinogen 1.0 0.2 - 1.0 EU/dL    Nitrites Negative NEG      Leukocyte Esterase Negative NEG      WBC 3-5 0 /hpf    RBC 20-50 0 /hpf    Epithelial cells 0-3 0 /hpf    Bacteria 0 0 /hpf    Casts 3-5 0 /lpf   EKG, 12 LEAD, INITIAL    Collection Time: 01/17/22 12:12 PM   Result Value Ref Range    Ventricular Rate 117 BPM    Atrial Rate 115 BPM    QRS Duration 76 ms    Q-T Interval 288 ms    QTC Calculation (Bezet) 401 ms    Calculated R Axis 15 degrees    Calculated T Axis 3 degrees    Diagnosis       !! AGE AND GENDER SPECIFIC ECG ANALYSIS !!   Atrial fibrillation with rapid ventricular response  Low voltage QRS  Nonspecific ST abnormality  No previous ECGs available  Confirmed by Arsalan Aldana MD (), ROLANDO COLLIER ([id number]) on 1/17/2022 2:04:55 PM     BLOOD GAS & LYTES, ARTERIAL    Collection Time: 01/17/22 12:40 PM   Result Value Ref Range    pH (POC) 7.29 (L) 7.35 - 7.45      pCO2 (POC) 62.5 (HH) 35 - 45 MMHG    pO2 (POC) 65 (L) 75 - 100 MMHG    Sodium,  (L) 136 - 145 MMOL/L    Potassium, POC 4.6 3.5 - 5.1 MMOL/L    Calcium, ionized (POC) 1.17 1.12 - 1.32 mmol/L    Glucose, POC 86 65 - 100 MG/DL    Base excess (POC) 1.2 mmol/L    HCO3 (POC) 29.7 (H) 22 - 26 MMOL/L    CO2, POC 31 (H) 13 - 23 MMOL/L    O2 SAT 89 %    Sample source ARTERIAL      SITE RIGHT RADIAL      JULIO'S TEST Positive      Device: Non rebreather      Performed by Jaye Prieto, POC Cannot be calculated >60 ml/min/1.73m2    GFRNA, POC Cannot be calculated >60 ml/min/1.73m2    Critical value read back [facility]    LACTIC ACID    Collection Time: 01/17/22 12:56 PM   Result Value Ref Range Lactic acid 2.0 0.4 - 2.0 MMOL/L   BLOOD GAS & LYTES, ARTERIAL    Collection Time: 01/17/22  2:52 PM   Result Value Ref Range    pH (POC) 7.29 (L) 7.35 - 7.45      pCO2 (POC) 62.6 (HH) 35 - 45 MMHG    pO2 (POC) 58 (L) 75 - 100 MMHG    Sodium,  (L) 136 - 145 MMOL/L    Potassium, POC 4.7 3.5 - 5.1 MMOL/L    Calcium, ionized (POC) 1.17 1.12 - 1.32 mmol/L    Glucose, POC 89 65 - 100 MG/DL    Base excess (POC) 1.8 mmol/L    HCO3 (POC) 30.3 (H) 22 - 26 MMOL/L    CO2, POC 31 (H) 13 - 23 MMOL/L    O2 SAT 85 %    Sample source ARTERIAL      SITE RIGHT RADIAL      JULIO'S TEST Positive      Device: High Flow Nasal Cannula      Performed by Elia Goldstein, POC Cannot be calculated >60 ml/min/1.73m2    GFRNA, POC Cannot be calculated >60 ml/min/1.73m2   BLOOD GAS & LYTES, ARTERIAL    Collection Time: 01/17/22  2:58 PM   Result Value Ref Range    pH (POC) 7.28 (L) 7.35 - 7.45      pCO2 (POC) 61.3 (HH) 35 - 45 MMHG    pO2 (POC) 60 (L) 75 - 100 MMHG    Sodium,  (L) 136 - 145 MMOL/L    Potassium, POC 4.7 3.5 - 5.1 MMOL/L    Calcium, ionized (POC) 1.21 1.12 - 1.32 mmol/L    Glucose, POC 93 65 - 100 MG/DL    Base excess (POC) 0.5 mmol/L    HCO3 (POC) 28.9 (H) 22 - 26 MMOL/L    CO2, POC 30 (H) 13 - 23 MMOL/L    O2 SAT 86 %    Sample source ARTERIAL      SITE RIGHT RADIAL      JULIO'S TEST Positive      Device: High Flow Nasal Cannula      FIO2 100 %    Performed by Elia Goldstein, POC Cannot be calculated >60 ml/min/1.73m2    GFRNA, POC Cannot be calculated >60 ml/min/1.73m2    Critical value read back [facility]        All Micro Results     Procedure Component Value Units Date/Time    CULTURE, BLOOD [[id number]] Collected: 01/17/22 1145    Order Status: Completed Specimen: Blood Updated: 01/17/22 1150    CULTURE, BLOOD [[id number]] Collected: 01/17/22 1141    Order Status: Completed Specimen: Blood Updated: 01/17/22 1150          Other Studies:  XR CHEST PORT    Result Date: 1/17/2022  Chest portable CLINICAL INDICATION: Sepsis, worsening moderate to severe shortness of breath and weakness for about one week, Covid-19 positive COMPARISON: None TECHNIQUE: single AP portable view chest at 11:43 AM semiupright FINDINGS: Cardiac silhouette is mildly enlarged. Lung volumes are shallow leading to crowding and patient is rotated to the left. There is some artifact due to oxygen equipment overlying the upper chest. No evidence of a pneumothorax, dense lobar consolidation, or prominent pleural effusion. There may be a trace pleural effusion at the left costophrenic sulcus. Moderate multifocal bilateral groundglass and reticular infiltrates involving all lobes, right slightly greater than left. Bone density appears low. There are chronic degenerative changes of both shoulders, not unusual for age. Multifocal bilateral infiltrates, nonspecific but would be compatible with viral pneumonia. Medications Administered     acetaminophen (TYLENOL) tablet 650 mg     Admin Date  01/17/2022 Action  Given Dose  650 mg Route  Oral Administered By  Lucia Mccurdy RN          dexamethasone (DECADRON) 10 mg/mL injection 6 mg     Admin Date  01/17/2022 Action  Given Dose  6 mg Route  IntraVENous Administered By  Lucia Mccurdy RN          lactated Ringers infusion 1,000 mL     Admin Date  01/17/2022 Action  New Bag Dose  1,000 mL Route  IntraVENous Administered By  Lucia Mccurdy RN          piperacillin-tazobactam (ZOSYN) 4.5 g in 0.9% sodium chloride (MBP/ADV) 100 mL MBP     Admin Date  01/17/2022 Action  New Bag Dose  4.5 g Rate  200 mL/hr Route  IntraVENous Administered By  Lucia Mccurdy RN                Signed:  Chantelle Leyva MD    Part of this note may have been written by using a voice dictation software. The note has been proof read but may still contain some grammatical/other typographical errors.

## 2022-01-17 NOTE — ED TRIAGE NOTES
Pt brought in by EMS from [facility] for low oxygen. Pt COVID positive for possibly 1 week. Pt was 76% RA per staff. Pt presents with NRB and 97% O2 sats.

## 2022-01-17 NOTE — ED NOTES
Pt refusing dinner tray at this time. Tray placed at bedside in case pt changes her mind. Pt did drink some of tea on tray.

## 2022-01-18 PROBLEM — J80 ACUTE RESPIRATORY DISTRESS SYNDROME (ARDS) DUE TO COVID-19 VIRUS (HCC): Status: ACTIVE | Noted: 2022-01-01

## 2022-01-18 PROBLEM — R57.9 SHOCK (HCC): Status: ACTIVE | Noted: 2022-01-01

## 2022-01-18 PROBLEM — J96.01 ACUTE RESPIRATORY FAILURE WITH HYPOXIA (HCC): Status: ACTIVE | Noted: 2022-01-01

## 2022-01-18 PROBLEM — U07.1 PNEUMONIA DUE TO COVID-19 VIRUS: Status: ACTIVE | Noted: 2022-01-01

## 2022-01-18 PROBLEM — U07.1 ACUTE RESPIRATORY DISTRESS SYNDROME (ARDS) DUE TO COVID-19 VIRUS (HCC): Status: ACTIVE | Noted: 2022-01-01

## 2022-01-18 PROBLEM — Z78.9 DNI (DO NOT INTUBATE): Status: ACTIVE | Noted: 2022-01-01

## 2022-01-18 PROBLEM — J12.82 PNEUMONIA DUE TO COVID-19 VIRUS: Status: ACTIVE | Noted: 2022-01-01

## 2022-01-18 NOTE — DEATH NOTE
Hospitalist Discharge Summary for  Patient   Admit Date:  2022  1:47 AM   DC Note date: 2022  Name:  Susana Lam   Age:  80 y.o. Sex:  female  :  1932   MRN:  [id number]   Room:  [address] [place] [address]  PCP:  Bethany Hale MD    Problem List for this Hospitalization:  Hospital Problems as of 2022 Date Reviewed: 2020          Codes Class Noted - Resolved POA    Acute respiratory failure with hypoxia [facility]) ICD-10-CM: J96.01  ICD-9-CM: 518.81  2022 - Present Unknown        Pneumonia due to COVID-19 virus ICD-10-CM: U07.1, J12.82  ICD-9-CM: 480.8, 079.89  2022 - Present Unknown        Acute respiratory distress syndrome (ARDS) due to COVID-19 virus [facility]) ICD-10-CM: U07.1, J80  ICD-9-CM: 518.82, 079.89  2022 - Present Unknown        DNI (do not intubate) ICD-10-CM: Z78.9  ICD-9-CM: V49.89  2022 - Present Unknown        Shock ([address] ICD-10-CM: R57.9  ICD-9-CM: 785.50  2022 - Present Unknown            Hospital Course:  Patient is 51-year-old with morbid obesity/hypertension/atrial fibrillation/reflux/blood, who was at [facility] rehab for hypoxemia diagnosed with COVID approximately a week ago, found with sats in the 76s. Patient is not vaccinated against COVID-19 infection. On admission found to have a temperature of 102.9 with an elevated white count, acute kidney injury with acidosis and supratherapeutic INR. Patient admitted with sepsis and acute hypoxic respiratory failure secondary to COVID-19 infection. Patient started on Decadron, baricitinib and antibioticS for CAP coverage. Patient with progressive hypoxemia requiring 100% BiPAP and unresponsiveness. Also noted to be in shock, requiring pressor support. Patient was DNI. At this point CODE STATUS discussed with family and family opted for comfort care. Patient started on comfort care ONLY.     Time of Death:   13:04    Cause of Death:   Acute hypoxic respiratory failure secondary to COVID-19 infection  Septic shock    Time spent in patient discharge work and death certification 40 minutes. Procedures done this admission:  * No surgery found *    Consults this admission:  IP CONSULT TO PULMONOLOGY    Echocardiogram/EKG results:  No results found for this or any previous visit. EKG Results     None          Diagnostic Imaging/Tests:   No results found.     All Micro Results     None          Labs: Results:       BMP, Mg, Phos Recent Labs     01/18/22  0329 01/17/22  1141   * 134*   K 4.3 4.8    97*   CO2 28 30   AGAP 7 7   BUN 43* 39*   CREA 0.97 1.12*   CA 8.7 9.2   * 90      CBC Recent Labs     01/18/22  0329 01/17/22  1141   WBC 12.4* 20.4*   RBC 4.68 5.15   HGB 12.4 13.9   HCT 40.5 44.1    325   GRANS 93* 93*   LYMPH 3* 4*   EOS 0* 0*   MONOS 2* 2*   BASOS 0 1   IG 1 1   ANEU 11.6* 18.9*   ABL 0.4* 0.9   FOREST 0.0 0.0   ABM 0.3 0.3   ABB 0.0 0.1   AIG 0.1 0.2      LFT Recent Labs     01/18/22 0329 01/17/22  1141   ALT 14 15   AP 88 123   TP 5.8* 6.5   ALB 2.0* 2.2*   GLOB 3.8* 4.3*   AGRAT 0.5* 0.5*      Cardiac Testing Lab Results   Component Value Date/Time    B-type Natriuretic Peptide 246.0 (H) 09/20/2016 12:23 PM      Coagulation Tests Lab Results   Component Value Date/Time    Prothrombin time 62.3 (H) 01/18/2022 03:29 AM    Prothrombin time 49.0 (H) 01/17/2022 11:42 AM    Prothrombin time 22.8 (H) 12/21/2021 02:14 PM    INR 7.4 (HH) 01/18/2022 03:29 AM    INR 5.3 (HH) 01/17/2022 11:42 AM    INR 2.0 12/21/2021 02:14 PM      A1c Lab Results   Component Value Date/Time    Hemoglobin A1c 5.8 (H) 12/31/2020 03:24 PM    Hemoglobin A1c 5.8 (H) 07/31/2020 11:46 AM    Hemoglobin A1c 5.9 (H) 12/03/2019 02:36 PM      Lipid Panel Lab Results   Component Value Date/Time    Cholesterol, total 193 10/17/2017 12:29 PM    HDL Cholesterol 48 10/17/2017 12:29 PM    LDL, calculated 118 (H) 10/17/2017 12:29 PM    VLDL, calculated 27 10/17/2017 12:29 PM    Triglyceride 133 10/17/2017 12:29 PM      Thyroid Panel Lab Results   Component Value Date/Time    TSH 3.370 04/05/2018 12:29 PM    TSH 4.030 02/27/2017 03:20 PM        Most Recent UA Lab Results   Component Value Date/Time    Color ORANGE 01/17/2022 11:59 AM    Appearance CLOUDY 01/17/2022 11:59 AM    Specific gravity 1.029 (H) 01/17/2022 11:59 AM    pH (UA) 5.0 01/17/2022 11:59 AM    Protein 30 (A) 01/17/2022 11:59 AM    Glucose Negative 01/17/2022 11:59 AM    Ketone TRACE (A) 01/17/2022 11:59 AM    Bilirubin SMALL (A) 01/17/2022 11:59 AM    Blood Negative 01/17/2022 11:59 AM    Urobilinogen 1.0 01/17/2022 11:59 AM    Nitrites Negative 01/17/2022 11:59 AM    Leukocyte Esterase Negative 01/17/2022 11:59 AM    WBC 3-5 01/17/2022 11:59 AM    RBC 20-50 01/17/2022 11:59 AM    Epithelial cells 0-3 01/17/2022 11:59 AM    Bacteria 0 01/17/2022 11:59 AM    Casts 3-5 01/17/2022 11:59 AM          All Labs from Last 24 Hrs:  Recent Results (from the past 24 hour(s))   BLOOD GAS & LYTES, ARTERIAL    Collection Time: 01/17/22  2:52 PM   Result Value Ref Range    pH (POC) 7.29 (L) 7.35 - 7.45      pCO2 (POC) 62.6 (HH) 35 - 45 MMHG    pO2 (POC) 58 (L) 75 - 100 MMHG    Sodium,  (L) 136 - 145 MMOL/L    Potassium, POC 4.7 3.5 - 5.1 MMOL/L    Calcium, ionized (POC) 1.17 1.12 - 1.32 mmol/L    Glucose, POC 89 65 - 100 MG/DL    Base excess (POC) 1.8 mmol/L    HCO3 (POC) 30.3 (H) 22 - 26 MMOL/L    CO2, POC 31 (H) 13 - 23 MMOL/L    O2 SAT 85 %    Sample source ARTERIAL      SITE RIGHT RADIAL      JULIO'S TEST Positive      Device: High Flow Nasal Cannula      Performed by Brittany East, POC Cannot be calculated >60 ml/min/1.73m2    GFRNA, POC Cannot be calculated >60 ml/min/1.73m2   BLOOD GAS & LYTES, ARTERIAL    Collection Time: 01/17/22  2:58 PM   Result Value Ref Range    pH (POC) 7.28 (L) 7.35 - 7.45      pCO2 (POC) 61.3 (HH) 35 - 45 MMHG    pO2 (POC) 60 (L) 75 - 100 MMHG    Sodium,  (L) 136 - 145 MMOL/L    Potassium, POC 4.7 3.5 - 5.1 MMOL/L    Calcium, ionized (POC) 1.21 1.12 - 1.32 mmol/L    Glucose, POC 93 65 - 100 MG/DL    Base excess (POC) 0.5 mmol/L    HCO3 (POC) 28.9 (H) 22 - 26 MMOL/L    CO2, POC 30 (H) 13 - 23 MMOL/L    O2 SAT 86 %    Sample source ARTERIAL      SITE RIGHT RADIAL      JULIO'S TEST Positive      Device: High Flow Nasal Cannula      FIO2 100 %    Performed by Renea Arias, POC Cannot be calculated >60 ml/min/1.73m2    GFRNA, POC Cannot be calculated >60 ml/min/1.73m2    Critical value read back BLAVCKMAN    CBC WITH AUTOMATED DIFF    Collection Time: 01/18/22  3:29 AM   Result Value Ref Range    WBC 12.4 (H) 4.3 - 11.1 K/uL    RBC 4.68 4.05 - 5.2 M/uL    HGB 12.4 11.7 - 15.4 g/dL    HCT 40.5 35.8 - 46.3 %    MCV 86.5 79.6 - 97.8 FL    MCH 26.5 26.1 - 32.9 PG    MCHC 30.6 (L) 31.4 - 35.0 g/dL    RDW 16.1 (H) 11.9 - 14.6 %    PLATELET 968 374 - 655 K/uL    MPV 11.1 9.4 - 12.3 FL    ABSOLUTE NRBC 0.00 0.0 - 0.2 K/uL    DF AUTOMATED      NEUTROPHILS 93 (H) 43 - 78 %    LYMPHOCYTES 3 (L) 13 - 44 %    MONOCYTES 2 (L) 4.0 - 12.0 %    EOSINOPHILS 0 (L) 0.5 - 7.8 %    BASOPHILS 0 0.0 - 2.0 %    IMMATURE GRANULOCYTES 1 0.0 - 5.0 %    ABS. NEUTROPHILS 11.6 (H) 1.7 - 8.2 K/UL    ABS. LYMPHOCYTES 0.4 (L) 0.5 - 4.6 K/UL    ABS. MONOCYTES 0.3 0.1 - 1.3 K/UL    ABS. EOSINOPHILS 0.0 0.0 - 0.8 K/UL    ABS. BASOPHILS 0.0 0.0 - 0.2 K/UL    ABS. IMM. GRANS. 0.1 0.0 - 0.5 K/UL   METABOLIC PANEL, COMPREHENSIVE    Collection Time: 01/18/22  3:29 AM   Result Value Ref Range    Sodium 135 (L) 136 - 145 mmol/L    Potassium 4.3 3.5 - 5.1 mmol/L    Chloride 100 98 - 107 mmol/L    CO2 28 21 - 32 mmol/L    Anion gap 7 7 - 16 mmol/L    Glucose 102 (H) 65 - 100 mg/dL    BUN 43 (H) 8 - 23 MG/DL    Creatinine 0.97 0.6 - 1.0 MG/DL    GFR est AA >60 >60 ml/min/1.73m2    GFR est non-AA 57 (L) >60 ml/min/1.73m2    Calcium 8.7 8.3 - 10.4 MG/DL    Bilirubin, total 0.7 0.2 - 1.1 MG/DL    ALT (SGPT) 14 12 - 65 U/L    AST (SGOT) 30 15 - 37 U/L    Alk. phosphatase 88 50 - 136 U/L    Protein, total 5.8 (L) 6.3 - 8.2 g/dL    Albumin 2.0 (L) 3.2 - 4.6 g/dL    Globulin 3.8 (H) 2.3 - 3.5 g/dL    A-G Ratio 0.5 (L) 1.2 - 3.5     PROTHROMBIN TIME + INR    Collection Time: 01/18/22  3:29 AM   Result Value Ref Range    Prothrombin time 62.3 (H) 12.6 - 14.5 sec    INR 7.4 (HH)     BLOOD GAS, ARTERIAL POC    Collection Time: 01/18/22  8:36 AM   Result Value Ref Range    Device: BIPAP MASK      FIO2 (POC) 100 %    pH (POC) 7.23 (LL) 7.35 - 7.45      pCO2 (POC) 66.1 (HH) 35 - 45 MMHG    pO2 (POC) 62 (L) 75 - 100 MMHG    HCO3 (POC) 27.9 (H) 22 - 26 MMOL/L    sO2 (POC) 85.5 (L) 95 - 98 %    Base deficit (POC) 1.3 mmol/L    Tidal volume 423 ml    PIP (POC) 17      Allens test (POC) Positive      Inspiratory Time 1 sec    Total resp.  rate 38      Site RIGHT RADIAL      Specimen type (POC) ARTERIAL      Performed by Mariluz     CO2, POC 29 (H) 13 - 23 MMOL/L       Current Med List in Hospital:   Current Facility-Administered Medications   Medication Dose Route Frequency    sodium chloride (NS) flush 5-40 mL  5-40 mL IntraVENous PRN    acetaminophen (TYLENOL) tablet 650 mg  650 mg Oral Q6H PRN    Or    acetaminophen (TYLENOL) suppository 650 mg  650 mg Rectal Q6H PRN    guaiFENesin-dextromethorphan (ROBITUSSIN DM) 100-10 mg/5 mL syrup 5 mL  5 mL Oral Q4H PRN    ondansetron (ZOFRAN ODT) tablet 4 mg  4 mg Oral Q8H PRN    Or    ondansetron (ZOFRAN) injection 4 mg  4 mg IntraVENous Q6H PRN    polyethylene glycol (MIRALAX) packet 17 g  17 g Oral DAILY PRN    sodium chloride (NS) flush 5-10 mL  5-10 mL IntraVENous PRN    benzonatate (TESSALON) capsule 100 mg  100 mg Oral TID PRN    nystatin (MYCOSTATIN) 100,000 unit/gram powder   Topical PRN    DOPamine (INTROPIN) 800 mg/250 mL (3,200 mcg/mL) infusion        LORazepam (INTENSOL) 2 mg/mL oral concentrate 1 mg  1 mg Oral Q1H PRN    morphine injection 2 mg  2 mg IntraVENous Q15MIN PRN    atropine 1 % ophthalmic solution 3 Drop  3 Drop SubLINGual TID PRN    acetaminophen (TYLENOL) suppository 650 mg  650 mg Rectal Q4H PRN    LORazepam (ATIVAN) injection 1 mg  1 mg IntraVENous Q15MIN PRN       Allergies   Allergen Reactions    Benadryl [Diphenhydramine Hcl] Unknown (comments)    Melatonin Unknown (comments)    Sulfa (Sulfonamide Antibiotics) Unknown (comments)     Immunization History   Administered Date(s) Administered    Influenza High Dose Vaccine PF 09/17/2018    Influenza Vaccine 10/15/2014    Influenza Vaccine (Quad) Mdck Pf (>2 Yrs Flucelvax QUAD [id number]) 11/01/2017    Influenza Vaccine (Quad) PF (>6 Mo Flulaval, Fluarix, and >3 Yrs Afluria, Fluzone [id number]) 10/26/2016    Influenza Vaccine (Tri) Adjuvanted (>65 Yrs FLUAD TRI [id number]) 10/23/2019    Influenza, Quadrivalent, Adjuvanted (>65 Yrs FLUAD QUAD [id number]) 09/16/2020    Pneumococcal Conjugate (PCV-13) 09/17/2018, 12/03/2019    Pneumococcal Vaccine (Unspecified Type) 03/09/2007    TB Skin Test (PPD) Intradermal 01/17/2022       Recent Vital Data:  Patient Vitals for the past 24 hrs:   Temp Pulse Resp BP SpO2   01/18/22 1150     (!) 81 %   01/18/22 1149  (!) 121 (!) 47  (!) 80 %   01/18/22 1134  (!) 149 (!) 34 (!) 123/58 99 %   01/18/22 1119  (!) 135 (!) 37 (!) 121/55 97 %   01/18/22 1104 98 °F (36.7 °C) (!) 108 (!) 37 (!) 134/58 100 %   01/18/22 1049  (!) 108 (!) 35 (!) 141/61 100 %   01/18/22 1034  (!) 101 (!) 36 (!) 147/67 100 %   01/18/22 1019  96 (!) 34 (!) 151/67 100 %   01/18/22 1004  74 (!) 35 (!) 167/72 100 %   01/18/22 0949  80 (!) 34 (!) 73/35 97 %   01/18/22 0934  93 (!) 32 (!) 146/65 99 %   01/18/22 0919  75 (!) 36 (!) 164/70 100 %   01/18/22 0904  78 (!) 36 (!) 66/42 94 %   01/18/22 0849  80 (!) 39 (!) 82/39 93 %   01/18/22 0834  (!) 102 (!) 0 (!) 99/45 92 %   01/18/22 0819  91 (!) 0 (!) 104/53 92 %   01/18/22 0719     93 %   01/18/22 0717  85 (!) 40 (!) 102/52 94 %   01/18/22 0702 98.2 °F (36.8 °C) 86 21 (!) 91/46 98 %   01/18/22 0640  89 12 (!) 89/54 93 %   01/18/22 0625  92 9 (!) 91/53 97 %   01/18/22 0615  96  (!) 94/51 93 %   01/18/22 0600  (!) 105  (!) 94/50 95 %   01/18/22 0545  95  (!) 109/54 96 %   01/18/22 0530  97  (!) 121/58 93 %   01/18/22 0515  94  (!) 116/57 95 %   01/18/22 0500  93 (!) 34 (!) 110/54 96 %   01/18/22 0445  93  (!) 124/56 96 %   01/18/22 0430  93  (!) 109/56 95 %   01/18/22 0415  91 (!) 37 (!) 113/53 94 %   01/18/22 0400    (!) 102/56    01/18/22 0350  98 (!) 32     01/18/22 0345    (!) 105/55    01/18/22 0330    (!) 109/52    01/18/22 0315    (!) 94/52    01/18/22 0310     90 %   01/18/22 0300 98 °F (36.7 °C) 84 (!) 33 (!) 92/51 94 %   01/18/22 0204 97 °F (36.1 °C) 89 (!) 31 (!) 101/48 (!) 88 %   01/18/22 0151     90 %     Oxygen Therapy  O2 Sat (%): (!) 81 % (01/18/22 1150)  Pulse via Oximetry: 125 beats per minute (01/18/22 1150)  O2 Device: Nasal cannula (01/18/22 1150)  Skin Assessment: Clean, dry, & intact (01/18/22 0300)  Skin Protection for O2 Device: No (01/18/22 0204)  O2 Flow Rate (L/min): 2 l/min (01/18/22 1150)  FIO2 (%): 100 % (01/18/22 0719)    Estimated body mass index is 45.97 kg/m² as calculated from the following:    Height as of this encounter: 5' 1\" (1.549 m). Weight as of this encounter: 110.4 kg (243 lb 4.8 oz). Intake/Output Summary (Last 24 hours) at 1/18/2022 1355  Last data filed at 1/18/2022 0545  Gross per 24 hour   Intake 216.25 ml   Output    Net 216.25 ml         Discharge Exam:  General: Lifeless  Eyes: Pupils fixed/nonreactive  Lungs: No breath sounds  Heart:  No heart sounds  Neurologic: unresponsive    Signed:  Zack Vargas MD    Part of this note may have been written by using a voice dictation software. The note has been proof read but may still contain some grammatical/other typographical errors.

## 2022-01-18 NOTE — DISCHARGE SUMMARY
Hospitalist Discharge Summary for  Patient   Admit Date:  2022  1:47 AM   DC Note date: 2022  Name:  Alyssa Solorio   Age:  80 y.o. Sex:  female  :  1932   MRN:  [id number]   Room:  [address] [place] [address]  PCP:  Jermain Jewell MD    Problem List for this Hospitalization:  Hospital Problems as of 2022 Date Reviewed: 2020          Codes Class Noted - Resolved POA    Acute respiratory failure with hypoxia [facility]) ICD-10-CM: J96.01  ICD-9-CM: 518.81  2022 - Present Unknown        Pneumonia due to COVID-19 virus ICD-10-CM: U07.1, J12.82  ICD-9-CM: 480.8, 079.89  2022 - Present Unknown        Acute respiratory distress syndrome (ARDS) due to COVID-19 virus [facility]) ICD-10-CM: U07.1, J80  ICD-9-CM: 518.82, 079.89  2022 - Present Unknown        DNI (do not intubate) ICD-10-CM: Z78.9  ICD-9-CM: V49.89  2022 - Present Unknown        Shock ([address] ICD-10-CM: R57.9  ICD-9-CM: 785.50  2022 - Present Unknown            Hospital Course:  Patient is 79-year-old with morbid obesity/hypertension/atrial fibrillation/reflux/blood, who was at [facility] rehab for hypoxemia diagnosed with COVID approximately a week ago, found with sats in the 76s. Patient is not vaccinated against COVID-19 infection. On admission found to have a temperature of 102.9 with an elevated white count, acute kidney injury with acidosis and supratherapeutic INR. Patient admitted with sepsis and acute hypoxic respiratory failure secondary to COVID-19 infection. Patient started on Decadron, baricitinib and antibioticS for CAP coverage. Patient with progressive hypoxemia requiring 100% BiPAP and unresponsiveness. Also noted to be in shock, requiring pressor support. Patient was DNI. At this point CODE STATUS discussed with family and family opted for comfort care. Patient started on comfort care ONLY.     Time of Death:   13:04    Cause of Death:   Acute hypoxic respiratory failure secondary to COVID-19 infection  Septic shock    Time spent in patient discharge work and death certification 40 minutes. Procedures done this admission:  * No surgery found *    Consults this admission:  IP CONSULT TO PULMONOLOGY    Echocardiogram/EKG results:  No results found for this or any previous visit. EKG Results     None          Diagnostic Imaging/Tests:   No results found.     All Micro Results     None          Labs: Results:       BMP, Mg, Phos Recent Labs     01/18/22  0329 01/17/22  1141   * 134*   K 4.3 4.8    97*   CO2 28 30   AGAP 7 7   BUN 43* 39*   CREA 0.97 1.12*   CA 8.7 9.2   * 90      CBC Recent Labs     01/18/22  0329 01/17/22  1141   WBC 12.4* 20.4*   RBC 4.68 5.15   HGB 12.4 13.9   HCT 40.5 44.1    325   GRANS 93* 93*   LYMPH 3* 4*   EOS 0* 0*   MONOS 2* 2*   BASOS 0 1   IG 1 1   ANEU 11.6* 18.9*   ABL 0.4* 0.9   FOREST 0.0 0.0   ABM 0.3 0.3   ABB 0.0 0.1   AIG 0.1 0.2      LFT Recent Labs     01/18/22 0329 01/17/22  1141   ALT 14 15   AP 88 123   TP 5.8* 6.5   ALB 2.0* 2.2*   GLOB 3.8* 4.3*   AGRAT 0.5* 0.5*      Cardiac Testing Lab Results   Component Value Date/Time    B-type Natriuretic Peptide 246.0 (H) 09/20/2016 12:23 PM      Coagulation Tests Lab Results   Component Value Date/Time    Prothrombin time 62.3 (H) 01/18/2022 03:29 AM    Prothrombin time 49.0 (H) 01/17/2022 11:42 AM    Prothrombin time 22.8 (H) 12/21/2021 02:14 PM    INR 7.4 (HH) 01/18/2022 03:29 AM    INR 5.3 (HH) 01/17/2022 11:42 AM    INR 2.0 12/21/2021 02:14 PM      A1c Lab Results   Component Value Date/Time    Hemoglobin A1c 5.8 (H) 12/31/2020 03:24 PM    Hemoglobin A1c 5.8 (H) 07/31/2020 11:46 AM    Hemoglobin A1c 5.9 (H) 12/03/2019 02:36 PM      Lipid Panel Lab Results   Component Value Date/Time    Cholesterol, total 193 10/17/2017 12:29 PM    HDL Cholesterol 48 10/17/2017 12:29 PM    LDL, calculated 118 (H) 10/17/2017 12:29 PM    VLDL, calculated 27 10/17/2017 12:29 PM    Triglyceride 133 10/17/2017 12:29 PM      Thyroid Panel Lab Results   Component Value Date/Time    TSH 3.370 04/05/2018 12:29 PM    TSH 4.030 02/27/2017 03:20 PM        Most Recent UA Lab Results   Component Value Date/Time    Color ORANGE 01/17/2022 11:59 AM    Appearance CLOUDY 01/17/2022 11:59 AM    Specific gravity 1.029 (H) 01/17/2022 11:59 AM    pH (UA) 5.0 01/17/2022 11:59 AM    Protein 30 (A) 01/17/2022 11:59 AM    Glucose Negative 01/17/2022 11:59 AM    Ketone TRACE (A) 01/17/2022 11:59 AM    Bilirubin SMALL (A) 01/17/2022 11:59 AM    Blood Negative 01/17/2022 11:59 AM    Urobilinogen 1.0 01/17/2022 11:59 AM    Nitrites Negative 01/17/2022 11:59 AM    Leukocyte Esterase Negative 01/17/2022 11:59 AM    WBC 3-5 01/17/2022 11:59 AM    RBC 20-50 01/17/2022 11:59 AM    Epithelial cells 0-3 01/17/2022 11:59 AM    Bacteria 0 01/17/2022 11:59 AM    Casts 3-5 01/17/2022 11:59 AM          All Labs from Last 24 Hrs:  Recent Results (from the past 24 hour(s))   BLOOD GAS & LYTES, ARTERIAL    Collection Time: 01/17/22  2:52 PM   Result Value Ref Range    pH (POC) 7.29 (L) 7.35 - 7.45      pCO2 (POC) 62.6 (HH) 35 - 45 MMHG    pO2 (POC) 58 (L) 75 - 100 MMHG    Sodium,  (L) 136 - 145 MMOL/L    Potassium, POC 4.7 3.5 - 5.1 MMOL/L    Calcium, ionized (POC) 1.17 1.12 - 1.32 mmol/L    Glucose, POC 89 65 - 100 MG/DL    Base excess (POC) 1.8 mmol/L    HCO3 (POC) 30.3 (H) 22 - 26 MMOL/L    CO2, POC 31 (H) 13 - 23 MMOL/L    O2 SAT 85 %    Sample source ARTERIAL      SITE RIGHT RADIAL      JULIO'S TEST Positive      Device: High Flow Nasal Cannula      Performed by Wenda Ohms, POC Cannot be calculated >60 ml/min/1.73m2    GFRNA, POC Cannot be calculated >60 ml/min/1.73m2   BLOOD GAS & LYTES, ARTERIAL    Collection Time: 01/17/22  2:58 PM   Result Value Ref Range    pH (POC) 7.28 (L) 7.35 - 7.45      pCO2 (POC) 61.3 (HH) 35 - 45 MMHG    pO2 (POC) 60 (L) 75 - 100 MMHG    Sodium,  (L) 136 - 145 MMOL/L    Potassium, POC 4.7 3.5 - 5.1 MMOL/L    Calcium, ionized (POC) 1.21 1.12 - 1.32 mmol/L    Glucose, POC 93 65 - 100 MG/DL    Base excess (POC) 0.5 mmol/L    HCO3 (POC) 28.9 (H) 22 - 26 MMOL/L    CO2, POC 30 (H) 13 - 23 MMOL/L    O2 SAT 86 %    Sample source ARTERIAL      SITE RIGHT RADIAL      JULIO'S TEST Positive      Device: High Flow Nasal Cannula      FIO2 100 %    Performed by Emmanuelle Ware, POC Cannot be calculated >60 ml/min/1.73m2    GFRNA, POC Cannot be calculated >60 ml/min/1.73m2    Critical value read back BLAVCKMAN    CBC WITH AUTOMATED DIFF    Collection Time: 01/18/22  3:29 AM   Result Value Ref Range    WBC 12.4 (H) 4.3 - 11.1 K/uL    RBC 4.68 4.05 - 5.2 M/uL    HGB 12.4 11.7 - 15.4 g/dL    HCT 40.5 35.8 - 46.3 %    MCV 86.5 79.6 - 97.8 FL    MCH 26.5 26.1 - 32.9 PG    MCHC 30.6 (L) 31.4 - 35.0 g/dL    RDW 16.1 (H) 11.9 - 14.6 %    PLATELET 429 624 - 724 K/uL    MPV 11.1 9.4 - 12.3 FL    ABSOLUTE NRBC 0.00 0.0 - 0.2 K/uL    DF AUTOMATED      NEUTROPHILS 93 (H) 43 - 78 %    LYMPHOCYTES 3 (L) 13 - 44 %    MONOCYTES 2 (L) 4.0 - 12.0 %    EOSINOPHILS 0 (L) 0.5 - 7.8 %    BASOPHILS 0 0.0 - 2.0 %    IMMATURE GRANULOCYTES 1 0.0 - 5.0 %    ABS. NEUTROPHILS 11.6 (H) 1.7 - 8.2 K/UL    ABS. LYMPHOCYTES 0.4 (L) 0.5 - 4.6 K/UL    ABS. MONOCYTES 0.3 0.1 - 1.3 K/UL    ABS. EOSINOPHILS 0.0 0.0 - 0.8 K/UL    ABS. BASOPHILS 0.0 0.0 - 0.2 K/UL    ABS. IMM. GRANS. 0.1 0.0 - 0.5 K/UL   METABOLIC PANEL, COMPREHENSIVE    Collection Time: 01/18/22  3:29 AM   Result Value Ref Range    Sodium 135 (L) 136 - 145 mmol/L    Potassium 4.3 3.5 - 5.1 mmol/L    Chloride 100 98 - 107 mmol/L    CO2 28 21 - 32 mmol/L    Anion gap 7 7 - 16 mmol/L    Glucose 102 (H) 65 - 100 mg/dL    BUN 43 (H) 8 - 23 MG/DL    Creatinine 0.97 0.6 - 1.0 MG/DL    GFR est AA >60 >60 ml/min/1.73m2    GFR est non-AA 57 (L) >60 ml/min/1.73m2    Calcium 8.7 8.3 - 10.4 MG/DL    Bilirubin, total 0.7 0.2 - 1.1 MG/DL    ALT (SGPT) 14 12 - 65 U/L    AST (SGOT) 30 15 - 37 U/L    Alk. phosphatase 88 50 - 136 U/L    Protein, total 5.8 (L) 6.3 - 8.2 g/dL    Albumin 2.0 (L) 3.2 - 4.6 g/dL    Globulin 3.8 (H) 2.3 - 3.5 g/dL    A-G Ratio 0.5 (L) 1.2 - 3.5     PROTHROMBIN TIME + INR    Collection Time: 01/18/22  3:29 AM   Result Value Ref Range    Prothrombin time 62.3 (H) 12.6 - 14.5 sec    INR 7.4 (HH)     BLOOD GAS, ARTERIAL POC    Collection Time: 01/18/22  8:36 AM   Result Value Ref Range    Device: BIPAP MASK      FIO2 (POC) 100 %    pH (POC) 7.23 (LL) 7.35 - 7.45      pCO2 (POC) 66.1 (HH) 35 - 45 MMHG    pO2 (POC) 62 (L) 75 - 100 MMHG    HCO3 (POC) 27.9 (H) 22 - 26 MMOL/L    sO2 (POC) 85.5 (L) 95 - 98 %    Base deficit (POC) 1.3 mmol/L    Tidal volume 423 ml    PIP (POC) 17      Allens test (POC) Positive      Inspiratory Time 1 sec    Total resp.  rate 38      Site RIGHT RADIAL      Specimen type (POC) ARTERIAL      Performed by Mariluz     CO2, POC 29 (H) 13 - 23 MMOL/L       Current Med List in Hospital:   Current Facility-Administered Medications   Medication Dose Route Frequency    sodium chloride (NS) flush 5-40 mL  5-40 mL IntraVENous PRN    acetaminophen (TYLENOL) tablet 650 mg  650 mg Oral Q6H PRN    Or    acetaminophen (TYLENOL) suppository 650 mg  650 mg Rectal Q6H PRN    guaiFENesin-dextromethorphan (ROBITUSSIN DM) 100-10 mg/5 mL syrup 5 mL  5 mL Oral Q4H PRN    ondansetron (ZOFRAN ODT) tablet 4 mg  4 mg Oral Q8H PRN    Or    ondansetron (ZOFRAN) injection 4 mg  4 mg IntraVENous Q6H PRN    polyethylene glycol (MIRALAX) packet 17 g  17 g Oral DAILY PRN    sodium chloride (NS) flush 5-10 mL  5-10 mL IntraVENous PRN    benzonatate (TESSALON) capsule 100 mg  100 mg Oral TID PRN    nystatin (MYCOSTATIN) 100,000 unit/gram powder   Topical PRN    DOPamine (INTROPIN) 800 mg/250 mL (3,200 mcg/mL) infusion        LORazepam (INTENSOL) 2 mg/mL oral concentrate 1 mg  1 mg Oral Q1H PRN    morphine injection 2 mg  2 mg IntraVENous Q15MIN PRN    atropine 1 % ophthalmic solution 3 Drop  3 Drop SubLINGual TID PRN    acetaminophen (TYLENOL) suppository 650 mg  650 mg Rectal Q4H PRN    LORazepam (ATIVAN) injection 1 mg  1 mg IntraVENous Q15MIN PRN       Allergies   Allergen Reactions    Benadryl [Diphenhydramine Hcl] Unknown (comments)    Melatonin Unknown (comments)    Sulfa (Sulfonamide Antibiotics) Unknown (comments)     Immunization History   Administered Date(s) Administered    Influenza High Dose Vaccine PF 09/17/2018    Influenza Vaccine 10/15/2014    Influenza Vaccine (Quad) Mdck Pf (>2 Yrs Flucelvax QUAD [id number]) 11/01/2017    Influenza Vaccine (Quad) PF (>6 Mo Flulaval, Fluarix, and >3 Yrs Afluria, Fluzone [id number]) 10/26/2016    Influenza Vaccine (Tri) Adjuvanted (>65 Yrs FLUAD TRI [id number]) 10/23/2019    Influenza, Quadrivalent, Adjuvanted (>65 Yrs FLUAD QUAD [id number]) 09/16/2020    Pneumococcal Conjugate (PCV-13) 09/17/2018, 12/03/2019    Pneumococcal Vaccine (Unspecified Type) 03/09/2007    TB Skin Test (PPD) Intradermal 01/17/2022       Recent Vital Data:  Patient Vitals for the past 24 hrs:   Temp Pulse Resp BP SpO2   01/18/22 1150     (!) 81 %   01/18/22 1149  (!) 121 (!) 47  (!) 80 %   01/18/22 1134  (!) 149 (!) 34 (!) 123/58 99 %   01/18/22 1119  (!) 135 (!) 37 (!) 121/55 97 %   01/18/22 1104 98 °F (36.7 °C) (!) 108 (!) 37 (!) 134/58 100 %   01/18/22 1049  (!) 108 (!) 35 (!) 141/61 100 %   01/18/22 1034  (!) 101 (!) 36 (!) 147/67 100 %   01/18/22 1019  96 (!) 34 (!) 151/67 100 %   01/18/22 1004  74 (!) 35 (!) 167/72 100 %   01/18/22 0949  80 (!) 34 (!) 73/35 97 %   01/18/22 0934  93 (!) 32 (!) 146/65 99 %   01/18/22 0919  75 (!) 36 (!) 164/70 100 %   01/18/22 0904  78 (!) 36 (!) 66/42 94 %   01/18/22 0849  80 (!) 39 (!) 82/39 93 %   01/18/22 0834  (!) 102 (!) 0 (!) 99/45 92 %   01/18/22 0819  91 (!) 0 (!) 104/53 92 %   01/18/22 0719     93 %   01/18/22 0717  85 (!) 40 (!) 102/52 94 %   01/18/22 0702 98.2 °F (36.8 °C) 86 21 (!) 91/46 98 %   01/18/22 0640  89 12 (!) 89/54 93 %   01/18/22 0625  92 9 (!) 91/53 97 %   01/18/22 0615  96  (!) 94/51 93 %   01/18/22 0600  (!) 105  (!) 94/50 95 %   01/18/22 0545  95  (!) 109/54 96 %   01/18/22 0530  97  (!) 121/58 93 %   01/18/22 0515  94  (!) 116/57 95 %   01/18/22 0500  93 (!) 34 (!) 110/54 96 %   01/18/22 0445  93  (!) 124/56 96 %   01/18/22 0430  93  (!) 109/56 95 %   01/18/22 0415  91 (!) 37 (!) 113/53 94 %   01/18/22 0400    (!) 102/56    01/18/22 0350  98 (!) 32     01/18/22 0345    (!) 105/55    01/18/22 0330    (!) 109/52    01/18/22 0315    (!) 94/52    01/18/22 0310     90 %   01/18/22 0300 98 °F (36.7 °C) 84 (!) 33 (!) 92/51 94 %   01/18/22 0204 97 °F (36.1 °C) 89 (!) 31 (!) 101/48 (!) 88 %   01/18/22 0151     90 %     Oxygen Therapy  O2 Sat (%): (!) 81 % (01/18/22 1150)  Pulse via Oximetry: 125 beats per minute (01/18/22 1150)  O2 Device: Nasal cannula (01/18/22 1150)  Skin Assessment: Clean, dry, & intact (01/18/22 0300)  Skin Protection for O2 Device: No (01/18/22 0204)  O2 Flow Rate (L/min): 2 l/min (01/18/22 1150)  FIO2 (%): 100 % (01/18/22 0719)    Estimated body mass index is 45.97 kg/m² as calculated from the following:    Height as of this encounter: 5' 1\" (1.549 m). Weight as of this encounter: 110.4 kg (243 lb 4.8 oz). Intake/Output Summary (Last 24 hours) at 1/18/2022 1404  Last data filed at 1/18/2022 0545  Gross per 24 hour   Intake 216.25 ml   Output    Net 216.25 ml         Discharge Exam:  General: Lifeless  Eyes: Pupils fixed/nonreactive  Lungs: No breath sounds  Heart:  No heart sounds  Neurologic: unresponsive    Signed:  Enoch Headley MD    Part of this note may have been written by using a voice dictation software. The note has been proof read but may still contain some grammatical/other typographical errors.

## 2022-01-18 NOTE — DISCHARGE SUMMARY
Hospitalist Discharge Summary   Admit Date:  2022 11:16 AM   DC Note date: 2022  Name:  Galina Reyes   Age:  80 y.o. Sex:  female  :  1932   MRN:  [id number]   Room:  [place] [address]  PCP:  Avery Benjamin MD    Presenting Complaint: Positive For Covid-19 and Shortness of Breath    Initial Admission Diagnosis: No admission diagnoses are documented for this encounter.      Problem List for this Hospitalization:  Hospital Problems as of 2022 Date Reviewed: 2020          Codes Class Noted - Resolved POA    Supratherapeutic INR ICD-10-CM: R79.1  ICD-9-CM: 790.92  2022 - Present Yes        * (Principal) Acute hypoxemic respiratory failure due to COVID-19 [facility]) ICD-10-CM: U07.1, J96.01  ICD-9-CM: 518.81, 079.89, 799.02  2022 - Present Yes        Bilateral pneumonia ICD-10-CM: J18.9  ICD-9-CM: 486  2022 - Present Unknown        MARLEY (acute kidney injury) ([facility] ICD-10-CM: N17.9  ICD-9-CM: 584.9  2022 - Present Yes        Sepsis ([facility] ICD-10-CM: A41.9  ICD-9-CM: 038.9, 995.91  2022 - Present Yes        Fever ICD-10-CM: R50.9  ICD-9-CM: 780.60  2022 - Present Yes        Sinus tachycardia ICD-10-CM: R00.0  ICD-9-CM: 427.89  2022 - Present Yes        Leukocytosis ICD-10-CM: D72.829  ICD-9-CM: 288.60  2022 - Present Yes        Physical debility ICD-10-CM: R53.81  ICD-9-CM: 799.3  2020 - Present Yes        Gastroesophageal reflux disease without esophagitis ICD-10-CM: K21.9  ICD-9-CM: 530.81  10/17/2017 - Present Yes        Glaucoma ICD-10-CM: H40.9  ICD-9-CM: 365.9  10/17/2017 - Present Yes        Monitoring for long-term anticoagulant use ICD-10-CM: Z51.81, Z79.01  ICD-9-CM: V58.61  3/9/2016 - Present Yes        Atrial fibrillation ([facility] ICD-10-CM: I48.91  ICD-9-CM: 427.31  2015 - Present Yes        Morbid obesity ([facility] ICD-10-CM: E66.01  ICD-9-CM: 278.01  2015 - Present Yes        Essential hypertension ICD-10-CM: I10  ICD-9-CM: 401.9 7/16/2015 - Present Yes        Sleep apnea ICD-10-CM: G47.30  ICD-9-CM: 780.57  7/16/2015 - Present Yes            Did Patient have Sepsis (YES OR NO): Yes    Hospital Course:  Jered MartínezZuly Lay is a 80 y.o. female with a h/o morbid obesity, HTN, HLD, atrial fibrillation on warfarin, GERD, glaucoma who was sent in from [facility] rehab for hypoxia. She was diagnosed with COVID approximately 1 week ago. Today she was apparently found to have a RA O2 saturation of 76% so she was brought here. She is a poor historian and does not provide a trustworthy ROS. Says she is not currently SOB and denies chest pain, N/V/D, palpitations, headaches, syncope. She is not vaccinated against COVID-19. She was initially ok on 6L then required NRB. She was febrile with Tm 102.9. WBCs elevated, mild MARLEY, ABG with mild respiratory acidosis (but patient did not tolerate Bipap), INR 5.3. CXR shows bilateral infiltrates. She was given dexamethasone and antibiotics and we are consulted for admission and further management. Disposition: Transfer to D COVID unit  Diet: ADULT DIET Regular  Code Status: DNR    Follow Up Orders:  No orders of the defined types were placed in this encounter. Follow-up Information    None         Follow up labs/diagnostics (ultimately defer to outpatient provider):  Per [facility]     Time spent in patient discharge and coordination 35 minutes. Plan was discussed with patient and daughter, RN. All questions answered. Patient was stable at time of discharge. Instructions given to call a physician or return if any concerns. Discharge Info:   Current Discharge Medication List          Procedures done this admission:  * No surgery found *    Consults this admission:  None    Echocardiogram/EKG results:  No results found for this or any previous visit.        EKG Results     Procedure 720 Value Units Date/Time    EKG, 12 LEAD, INITIAL [[id number]] Collected: 01/17/22 1212    Order Status: Completed Updated: 01/17/22 1405     Ventricular Rate 117 BPM      Atrial Rate 115 BPM      QRS Duration 76 ms      Q-T Interval 288 ms      QTC Calculation (Bezet) 401 ms      Calculated R Axis 15 degrees      Calculated T Axis 3 degrees      Diagnosis --     !! AGE AND GENDER SPECIFIC ECG ANALYSIS !! Atrial fibrillation with rapid ventricular response  Low voltage QRS  Nonspecific ST abnormality  No previous ECGs available  Confirmed by [facility]  MD ()ROLANDO ([id number]) on 1/17/2022 2:04:55 PM            Diagnostic Imaging/Tests:   XR CHEST PORT    Result Date: 1/17/2022  Chest portable CLINICAL INDICATION: Sepsis, worsening moderate to severe shortness of breath and weakness for about one week, Covid-19 positive COMPARISON: None TECHNIQUE: single AP portable view chest at 11:43 AM semiupright FINDINGS: Cardiac silhouette is mildly enlarged. Lung volumes are shallow leading to crowding and patient is rotated to the left. There is some artifact due to oxygen equipment overlying the upper chest. No evidence of a pneumothorax, dense lobar consolidation, or prominent pleural effusion. There may be a trace pleural effusion at the left costophrenic sulcus. Moderate multifocal bilateral groundglass and reticular infiltrates involving all lobes, right slightly greater than left. Bone density appears low. There are chronic degenerative changes of both shoulders, not unusual for age. Multifocal bilateral infiltrates, nonspecific but would be compatible with viral pneumonia.        All Micro Results     Procedure Component Value Units Date/Time    CULTURE, BLOOD [[id number]] Collected: 01/17/22 1145    Order Status: Completed Specimen: Blood Updated: 01/17/22 1150    CULTURE, BLOOD [[id number]] Collected: 01/17/22 1141    Order Status: Completed Specimen: Blood Updated: 01/17/22 1150          Labs: Results:       BMP, Mg, Phos Recent Labs     01/17/22  1141   *   K 4.8   CL 97*   CO2 30   AGAP 7   BUN 39*   CREA 1.12*   CA 9.2 GLU 90      CBC Recent Labs     01/17/22  1141   WBC 20.4*   RBC 5.15   HGB 13.9   HCT 44.1      GRANS 93*   LYMPH 4*   EOS 0*   MONOS 2*   BASOS 1   IG 1   ANEU 18.9*   ABL 0.9   FOREST 0.0   ABM 0.3   ABB 0.1   AIG 0.2      LFT Recent Labs     01/17/22  1141   ALT 15      TP 6.5   ALB 2.2*   GLOB 4.3*   AGRAT 0.5*      Cardiac Testing Lab Results   Component Value Date/Time    B-type Natriuretic Peptide 246.0 (H) 09/20/2016 12:23 PM      Coagulation Tests Lab Results   Component Value Date/Time    Prothrombin time 49.0 (H) 01/17/2022 11:42 AM    Prothrombin time 22.8 (H) 12/21/2021 02:14 PM    Prothrombin time 15.2 (H) 10/04/2021 05:20 PM    INR 5.3 (HH) 01/17/2022 11:42 AM    INR 2.0 12/21/2021 02:14 PM    INR 1.2 10/04/2021 05:20 PM      A1c Lab Results   Component Value Date/Time    Hemoglobin A1c 5.8 (H) 12/31/2020 03:24 PM    Hemoglobin A1c 5.8 (H) 07/31/2020 11:46 AM    Hemoglobin A1c 5.9 (H) 12/03/2019 02:36 PM      Lipid Panel Lab Results   Component Value Date/Time    Cholesterol, total 193 10/17/2017 12:29 PM    HDL Cholesterol 48 10/17/2017 12:29 PM    LDL, calculated 118 (H) 10/17/2017 12:29 PM    VLDL, calculated 27 10/17/2017 12:29 PM    Triglyceride 133 10/17/2017 12:29 PM      Thyroid Panel Lab Results   Component Value Date/Time    TSH 3.370 04/05/2018 12:29 PM    TSH 4.030 02/27/2017 03:20 PM        Most Recent UA Lab Results   Component Value Date/Time    Color ORANGE 01/17/2022 11:59 AM    Appearance CLOUDY 01/17/2022 11:59 AM    Specific gravity 1.029 (H) 01/17/2022 11:59 AM    pH (UA) 5.0 01/17/2022 11:59 AM    Protein 30 (A) 01/17/2022 11:59 AM    Glucose Negative 01/17/2022 11:59 AM    Ketone TRACE (A) 01/17/2022 11:59 AM    Bilirubin SMALL (A) 01/17/2022 11:59 AM    Blood Negative 01/17/2022 11:59 AM    Urobilinogen 1.0 01/17/2022 11:59 AM    Nitrites Negative 01/17/2022 11:59 AM    Leukocyte Esterase Negative 01/17/2022 11:59 AM    WBC 3-5 01/17/2022 11:59 AM    RBC 20-50 01/17/2022 11:59 AM    Epithelial cells 0-3 01/17/2022 11:59 AM    Bacteria 0 01/17/2022 11:59 AM    Casts 3-5 01/17/2022 11:59 AM          All Labs from Last 24 Hrs:  Recent Results (from the past 24 hour(s))   PROCALCITONIN    Collection Time: 01/17/22 11:41 AM   Result Value Ref Range    Procalcitonin 0.79 (H) 0.00 - 6.77 ng/mL   METABOLIC PANEL, COMPREHENSIVE    Collection Time: 01/17/22 11:41 AM   Result Value Ref Range    Sodium 134 (L) 136 - 145 mmol/L    Potassium 4.8 3.5 - 5.1 mmol/L    Chloride 97 (L) 98 - 107 mmol/L    CO2 30 21 - 32 mmol/L    Anion gap 7 7 - 16 mmol/L    Glucose 90 65 - 100 mg/dL    BUN 39 (H) 8 - 23 MG/DL    Creatinine 1.12 (H) 0.6 - 1.0 MG/DL    GFR est AA 59 (L) >60 ml/min/1.73m2    GFR est non-AA 49 (L) >60 ml/min/1.73m2    Calcium 9.2 8.3 - 10.4 MG/DL    Bilirubin, total 1.0 0.2 - 1.1 MG/DL    ALT (SGPT) 15 12 - 65 U/L    AST (SGOT) 30 15 - 37 U/L    Alk. phosphatase 123 50 - 130 U/L    Protein, total 6.5 6.3 - 8.2 g/dL    Albumin 2.2 (L) 3.2 - 4.6 g/dL    Globulin 4.3 (H) 2.3 - 3.5 g/dL    A-G Ratio 0.5 (L) 1.2 - 3.5     CBC WITH AUTOMATED DIFF    Collection Time: 01/17/22 11:41 AM   Result Value Ref Range    WBC 20.4 (H) 4.3 - 11.1 K/uL    RBC 5.15 4.05 - 5.2 M/uL    HGB 13.9 11.7 - 15.4 g/dL    HCT 44.1 35.8 - 46.3 %    MCV 85.6 79.6 - 97.8 FL    MCH 27.0 26.1 - 32.9 PG    MCHC 31.5 31.4 - 35.0 g/dL    RDW 16.1 (H) 11.9 - 14.6 %    PLATELET 195 368 - 510 K/uL    MPV 10.9 9.4 - 12.3 FL    ABSOLUTE NRBC 0.00 0.0 - 0.2 K/uL    DF AUTOMATED      NEUTROPHILS 93 (H) 43 - 78 %    LYMPHOCYTES 4 (L) 13 - 44 %    MONOCYTES 2 (L) 4.0 - 12.0 %    EOSINOPHILS 0 (L) 0.5 - 7.8 %    BASOPHILS 1 0.0 - 2.0 %    IMMATURE GRANULOCYTES 1 0.0 - 5.0 %    ABS. NEUTROPHILS 18.9 (H) 1.7 - 8.2 K/UL    ABS. LYMPHOCYTES 0.9 0.5 - 4.6 K/UL    ABS. MONOCYTES 0.3 0.1 - 1.3 K/UL    ABS. EOSINOPHILS 0.0 0.0 - 0.8 K/UL    ABS. BASOPHILS 0.1 0.0 - 0.2 K/UL    ABS. IMM.  GRANS. 0.2 0.0 - 0.5 K/UL   C REACTIVE PROTEIN, QT    Collection Time: 01/17/22 11:41 AM   Result Value Ref Range    C-Reactive protein 28.2 (H) 0.0 - 0.9 mg/dL   PROTHROMBIN TIME + INR    Collection Time: 01/17/22 11:42 AM   Result Value Ref Range    Prothrombin time 49.0 (H) 12.6 - 14.5 sec    INR 5.3 (HH)     URINALYSIS W/ RFLX MICROSCOPIC    Collection Time: 01/17/22 11:59 AM   Result Value Ref Range    Color ORANGE      Appearance CLOUDY      Specific gravity 1.029 (H) 1.001 - 1.023      pH (UA) 5.0 5.0 - 9.0      Protein 30 (A) NEG mg/dL    Glucose Negative mg/dL    Ketone TRACE (A) NEG mg/dL    Bilirubin SMALL (A) NEG      Blood Negative NEG      Urobilinogen 1.0 0.2 - 1.0 EU/dL    Nitrites Negative NEG      Leukocyte Esterase Negative NEG      WBC 3-5 0 /hpf    RBC 20-50 0 /hpf    Epithelial cells 0-3 0 /hpf    Bacteria 0 0 /hpf    Casts 3-5 0 /lpf   EKG, 12 LEAD, INITIAL    Collection Time: 01/17/22 12:12 PM   Result Value Ref Range    Ventricular Rate 117 BPM    Atrial Rate 115 BPM    QRS Duration 76 ms    Q-T Interval 288 ms    QTC Calculation (Bezet) 401 ms    Calculated R Axis 15 degrees    Calculated T Axis 3 degrees    Diagnosis       !! AGE AND GENDER SPECIFIC ECG ANALYSIS !!   Atrial fibrillation with rapid ventricular response  Low voltage QRS  Nonspecific ST abnormality  No previous ECGs available  Confirmed by [facility]  MD ()ROLANDO ([id number]) on 1/17/2022 2:04:55 PM     BLOOD GAS & LYTES, ARTERIAL    Collection Time: 01/17/22 12:40 PM   Result Value Ref Range    pH (POC) 7.29 (L) 7.35 - 7.45      pCO2 (POC) 62.5 (HH) 35 - 45 MMHG    pO2 (POC) 65 (L) 75 - 100 MMHG    Sodium,  (L) 136 - 145 MMOL/L    Potassium, POC 4.6 3.5 - 5.1 MMOL/L    Calcium, ionized (POC) 1.17 1.12 - 1.32 mmol/L    Glucose, POC 86 65 - 100 MG/DL    Base excess (POC) 1.2 mmol/L    HCO3 (POC) 29.7 (H) 22 - 26 MMOL/L    CO2, POC 31 (H) 13 - 23 MMOL/L    O2 SAT 89 %    Sample source ARTERIAL      SITE RIGHT RADIAL      JULIO'S TEST Positive      Device: Non rebreather      Performed by Brittany Cronin, POC Cannot be calculated >60 ml/min/1.73m2    GFRNA, POC Cannot be calculated >60 ml/min/1.73m2    Critical value read back VIOLA    LACTIC ACID    Collection Time: 01/17/22 12:56 PM   Result Value Ref Range    Lactic acid 2.0 0.4 - 2.0 MMOL/L   BLOOD GAS & LYTES, ARTERIAL    Collection Time: 01/17/22  2:52 PM   Result Value Ref Range    pH (POC) 7.29 (L) 7.35 - 7.45      pCO2 (POC) 62.6 (HH) 35 - 45 MMHG    pO2 (POC) 58 (L) 75 - 100 MMHG    Sodium,  (L) 136 - 145 MMOL/L    Potassium, POC 4.7 3.5 - 5.1 MMOL/L    Calcium, ionized (POC) 1.17 1.12 - 1.32 mmol/L    Glucose, POC 89 65 - 100 MG/DL    Base excess (POC) 1.8 mmol/L    HCO3 (POC) 30.3 (H) 22 - 26 MMOL/L    CO2, POC 31 (H) 13 - 23 MMOL/L    O2 SAT 85 %    Sample source ARTERIAL      SITE RIGHT RADIAL      JULIO'S TEST Positive      Device: High Flow Nasal Cannula      Performed by Brittany Cronin, POC Cannot be calculated >60 ml/min/1.73m2    GFRNA, POC Cannot be calculated >60 ml/min/1.73m2   BLOOD GAS & LYTES, ARTERIAL    Collection Time: 01/17/22  2:58 PM   Result Value Ref Range    pH (POC) 7.28 (L) 7.35 - 7.45      pCO2 (POC) 61.3 (HH) 35 - 45 MMHG    pO2 (POC) 60 (L) 75 - 100 MMHG    Sodium,  (L) 136 - 145 MMOL/L    Potassium, POC 4.7 3.5 - 5.1 MMOL/L    Calcium, ionized (POC) 1.21 1.12 - 1.32 mmol/L    Glucose, POC 93 65 - 100 MG/DL    Base excess (POC) 0.5 mmol/L    HCO3 (POC) 28.9 (H) 22 - 26 MMOL/L    CO2, POC 30 (H) 13 - 23 MMOL/L    O2 SAT 86 %    Sample source ARTERIAL      SITE RIGHT RADIAL      JULIO'S TEST Positive      Device: High Flow Nasal Cannula      FIO2 100 %    Performed by Brittany Cronin, POC Cannot be calculated >60 ml/min/1.73m2    GFRNA, POC Cannot be calculated >60 ml/min/1.73m2    Critical value read back [address]        Current Med List in Hospital:   Current Facility-Administered Medications   Medication Dose Route Frequency    tuberculin injection 5 Units  5 Units IntraDERMal ONCE    [START ON 1/18/2022] aspirin chewable tablet 81 mg  81 mg Oral DAILY    [START ON 1/18/2022] azithromycin (ZITHROMAX) 500 mg in 0.9% sodium chloride 250 mL (VIAL-MATE)  500 mg IntraVENous Q24H    [START ON 1/18/2022] baricitinib (OLUMIANT) tablet 2 mg  2 mg Oral DAILY    [START ON 1/18/2022] cefTRIAXone (ROCEPHIN) 2 g in 0.9% sodium chloride (MBP/ADV) 50 mL MBP  2 g IntraVENous Q24H    [START ON 1/18/2022] citalopram (CELEXA) tablet 10 mg  10 mg Oral DAILY    [START ON 1/18/2022] dexamethasone (DECADRON) 10 mg/mL injection 6 mg  6 mg IntraVENous Q24H    [START ON 1/18/2022] dorzolamide-timoloL (COSOPT) 22.3-6.8 mg/mL ophthalmic solution 1 Drop  1 Drop Both Eyes DAILY    latanoprost (XALATAN) 0.005 % ophthalmic solution 1 Drop  1 Drop Both Eyes QPM    [START ON 1/18/2022] metoprolol succinate (TOPROL-XL) XL tablet 50 mg  50 mg Oral DAILY    [START ON 1/18/2022] pantoprazole (PROTONIX) tablet 40 mg  40 mg Oral ACB    sodium chloride (NS) flush 5-40 mL  5-40 mL IntraVENous Q8H    0.9% sodium chloride infusion  75 mL/hr IntraVENous CONTINUOUS    acetaminophen (TYLENOL) tablet 650 mg  650 mg Oral Q6H PRN    Or    acetaminophen (TYLENOL) suppository 650 mg  650 mg Rectal Q6H PRN    benzonatate (TESSALON) capsule 100 mg  100 mg Oral TID PRN    guaiFENesin-dextromethorphan (ROBITUSSIN DM) 100-10 mg/5 mL syrup 5 mL  5 mL Oral Q4H PRN    ondansetron (ZOFRAN ODT) tablet 4 mg  4 mg Oral Q8H PRN    Or    ondansetron (ZOFRAN) injection 4 mg  4 mg IntraVENous Q6H PRN    polyethylene glycol (MIRALAX) packet 17 g  17 g Oral DAILY PRN    sodium chloride (NS) flush 5-10 mL  5-10 mL IntraVENous PRN    sodium chloride (NS) flush 5-40 mL  5-40 mL IntraVENous PRN     Current Outpatient Medications   Medication Sig    warfarin (COUMADIN) 5 mg tablet Take 1 Tab by mouth five (5) days a week.  2.5mg on Wed and Sun and 5mg other nights    metoprolol succinate (TOPROL-XL) 50 mg XL tablet TAKE 1 TABLET BY MOUTH DAILY    triamterene-hydroCHLOROthiazide (MAXZIDE) 75-50 mg per tablet TAKE 1 TABLET BY MOUTH DAILY    mupirocin (BACTROBAN) 2 % ointment Apply  to affected area daily. (Patient taking differently: Apply 1 Applicator to affected area two (2) times a week. RLE)    omeprazole (PRILOSEC) 20 mg capsule TAKE 1 CAPSULE DAILY (Patient taking differently: TAKE 1 CAPSULE DAILY by mouth)    ergocalciferol (ERGOCALCIFEROL) 1,250 mcg (50,000 unit) capsule TAKE 1 CAPSULE EVERY WEEK AS DIRECTED. (Patient taking differently: TAKE 1 CAPSULE EVERY WEEK  by mouth AS DIRECTED.)    cefadroxil (DURICEF) 500 mg capsule Take 1 Cap by mouth two (2) times a day.  gabapentin (NEURONTIN) 300 mg capsule TAKE 1 CAPSULE BY MOUTH TWICE A DAY (Patient taking differently: Take 300 mg by mouth two (2) times a day. TAKE 1 CAPSULE BY MOUTH TWICE A DAY)    spironolactone (ALDACTONE) 25 mg tablet TAKE 1 TABLET BY MOUTH DAILY    citalopram (CELEXA) 10 mg tablet Take 1 Tab by mouth daily.  nystatin (MYCOSTATIN) powder Use on affected area as needed.  warfarin (COUMADIN) 2.5 mg tablet Take 2.5 mg by mouth daily. 2.5mg on Wed and Sun and 5mg other nights    aspirin 81 mg chewable tablet Take 81 mg by mouth daily.  latanoprost (XALATAN) 0.005 % ophthalmic solution Administer 1 Drop to both eyes nightly. As directed    DORZOLAMIDE HCL/TIMOLOL MALEAT (COSOPT OP) Apply 1 Drop to eye daily. gtts every day to both eyes    bimatoprost (LUMIGAN) 0.01 % ophthalmic drops Administer 1 Drop to both eyes every evening.        Allergies   Allergen Reactions    Benadryl [Diphenhydramine Hcl] Unknown (comments)    Melatonin Unknown (comments)    Sulfa (Sulfonamide Antibiotics) Unknown (comments)     Immunization History   Administered Date(s) Administered    Influenza High Dose Vaccine PF 09/17/2018    Influenza Vaccine 10/15/2014    Influenza Vaccine (Quad) Mdck Pf (>2 Yrs Flucelvax QUAD [id number]) 11/01/2017    Influenza Vaccine (Quad) PF (>6 Mo Flulaval, Fluarix, and >3 Yrs Afluria, Fluzone [id number]) 10/26/2016    Influenza Vaccine (Tri) Adjuvanted (>65 Yrs FLUAD TRI [id number]) 10/23/2019    Influenza, Quadrivalent, Adjuvanted (>65 Yrs FLUAD QUAD 32652) 09/16/2020    Pneumococcal Conjugate (PCV-13) 09/17/2018, 12/03/2019    Pneumococcal Vaccine (Unspecified Type) 03/09/2007    TB Skin Test (PPD) Intradermal 01/17/2022       Recent Vital Data:  Patient Vitals for the past 24 hrs:   Temp Pulse BP SpO2   01/17/22 1904 97.9 °F (36.6 °C) 98  (!) 87 %   01/17/22 1725  (!) 101 (!) 106/58 91 %   01/17/22 1432  (!) 102  92 %   01/17/22 1221  (!) 101  93 %   01/17/22 1136  (!) 110 119/75 90 %   01/17/22 1132   119/75 91 %   01/17/22 1128 (!) 102.9 °F (39.4 °C)        Oxygen Therapy  O2 Sat (%): (!) 87 % (01/17/22 1904)  Pulse via Oximetry: 97 beats per minute (01/17/22 1904)    Estimated body mass index is 51.96 kg/m² as calculated from the following:    Height as of this encounter: 5' 1\" (1.549 m). Weight as of this encounter: 124.7 kg (275 lb). No intake or output data in the 24 hours ending 01/17/22 1938      Physical Exam:  General:          Well nourished. Morbidly obese. Conversational dyspnea. Head:               Normocephalic, atraumatic  Eyes:               Sclerae appear normal.  Pupils equally round. ENT:                Nares appear normal, no drainage. Moist oral mucosa  Neck:               No restricted ROM. Trachea midline   CV:                  Tachycardia, reg rhythm. No m/r/g. No jugular venous distension. Lungs:             Clear anteriorly, likely some basilar rales but difficult to appreciate due to body habitus. Airvo 60L/100% with bedside sats 90% at rest. Conversational dyspnea. Abdomen: Bowel sounds present. Soft, nontender, nondistended. Extremities:     No cyanosis or clubbing. Trace edema  Skin:                No rashes and normal coloration. Warm and dry. Neuro:             CN II-XII grossly intact. Sensation intact. A&Ox3  Psych:             Normal mood and affect. Signed:  Andie Crockett MD    Part of this note may have been written by using a voice dictation software. The note has been proof read but may still contain some grammatical/other typographical errors.

## 2022-01-18 NOTE — ACP (ADVANCE CARE PLANNING)
Advance Care Planning     General Advance Care Planning (ACP) Conversation      Date of Conversation: 1/17/2022  Conducted with: Patient with Decision Making Capacity per MD, Dr. Latrice Emmanuel:     Primary Decision Maker: Gonzalo Gomes - Child - [phone]    Primary Decision Maker: Jess Inman - Daughter - [phone]  Click here to complete [address] including selection of the Healthcare Decision Maker Relationship (ie \"Primary\")      Today we documented Decision Maker(s) consistent with Legal Next of Kin hierarchy. Content/Action Overview:   No LW/HCPOA on file currently. Children legal NOK. DNR per MD orders.      Length of Voluntary ACP Conversation in minutes:  <16 minutes (Non-Billable)    Shila Lopez RN

## 2022-01-18 NOTE — ACP (ADVANCE CARE PLANNING)
[facility] Hospitalist Service  At the heart of better care     Advance Care Planning   Admit Date:  2022  1:47 AM   Name:  Sudhir Damico. Korey Shahid   Age:  80 y.o. Sex:  female  :  1932   MRN:  [id number]   Room:  [address] [place] [address]. Korey Shahid is able to make her own decisions: No    If pt unable to make decisions, POA/surrogate decision maker:  Son    Other members present in the meeting:   Daughter and son    Patient / surrogate decision-maker directed:  Code Status: DNR/DNI, strict comfort care ONLY    Patient or surrogate consented to discussion of the current conditions, workup, management plans, prognosis, and understand the risk for further deterioration. Time spent: 29 minutes in direct discussion (face to face and/or over phone).     Signed:  Isaiah West MD

## 2022-01-18 NOTE — ACP (ADVANCE CARE PLANNING)
[facility] Hospitalist Service  At the heart of better care     Advance Care Planning   Admit Date:  2022 11:16 AM   Name:  Katelynn Girard   Age:  80 y.o. Sex:  female  :  1932   MRN:  [id number]   Room:  [address] [place] [address]. Jason Girard is able to make her own decisions: Yes    If pt unable to make decisions, POA/surrogate decision maker:  Calixto Valentin, daughter -- Arelis Delgado, son -- [phone]      Other members present in the meeting:   N/A    Patient / surrogate decision-maker directed:  Code Status: DO NOT RESUSCITATE, DO NOT INTUBATE -okay for other aggressive medical and surgical intervention    Other ACP topics discussed, if applicable:   N/A    Patient or surrogate consented to discussion of the current conditions, workup, management plans, prognosis, and understand the risk for further deterioration. Time spent: 16 minutes in direct discussion (face to face and/or over phone).     Signed:  Eda Closs, MD

## 2022-01-22 LAB
BACTERIA SPEC CULT: NORMAL
BACTERIA SPEC CULT: NORMAL
SERVICE CMNT-IMP: NORMAL
SERVICE CMNT-IMP: NORMAL

## 2022-02-04 LAB
ARTERIAL PATENCY WRIST A: POSITIVE
ARTERIAL PATENCY WRIST A: POSITIVE
BASE DEFICIT BLD-SCNC: 0.6 MMOL/L
BASE EXCESS BLD CALC-SCNC: 0.4 MMOL/L
BASE EXCESS BLD CALC-SCNC: 2.3 MMOL/L
BDY SITE: ABNORMAL
BDY SITE: ABNORMAL
GAS FLOW.O2 O2 DELIVERY SYS: ABNORMAL L/MIN
GAS FLOW.O2 O2 DELIVERY SYS: ABNORMAL L/MIN
GAS FLOW.O2 SETTING OXYMISER: 14 BPM
GAS FLOW.O2 SETTING OXYMISER: 14 L/M
HCO3 BLD-SCNC: 27.9 MMOL/L (ref 22–26)
HCO3 BLD-SCNC: 29.1 MMOL/L (ref 22–26)
O2/TOTAL GAS SETTING VFR VENT: 100 %
O2/TOTAL GAS SETTING VFR VENT: 100 %
PCO2 BLD: 61.9 MMHG (ref 35–45)
PCO2 BLD: 66.1 MMHG (ref 35–45)
PH BLD: 7.23 [PH] (ref 7.35–7.45)
PH BLD: 7.28 [PH] (ref 7.35–7.45)
PO2 BLD: 62 MMHG (ref 80–100)
PO2 BLD: 67 MMHG (ref 80–100)
SAO2 % BLD: 86 % (ref 92–97)
SAO2 % BLD: 90 % (ref 92–97)
SERVICE CMNT-IMP: ABNORMAL
SERVICE CMNT-IMP: ABNORMAL
SPECIMEN TYPE: ABNORMAL
SPECIMEN TYPE: ABNORMAL
VENTILATION MODE VENT: ABNORMAL

## 2023-05-04 NOTE — PROGRESS NOTES
ABG:    PH  7.234    CO2  66.1    O2  61.5    HCO3  27.9    BE  0.4    SO2  85.5
Bedside shift change report given to Justice Laughlin RN (oncoming nurse) by Johnny Richardson RN (offgoing nurse). Report included the following information SBAR, Kardex, Intake/Output, Recent Results, Med Rec Status and Cardiac Rhythm Afib.
Care Management Interventions  PCP Verified by CM: Yes  Mode of Transport at Discharge:  Other (see comment)  Transition of Care Consult (CM Consult): Discharge Planning,SNF (admitted from [facility])  Support Systems: Child(zaria)  Confirm Follow Up Transport: Family  Freedom of Choice List was Provided with Basic Dialogue that Supports the Patient's Individualized Plan of Care/Goals, Treatment Preferences and Shares the Quality Data Associated with the Providers?: Yes  [place] Resource Information Provided?:  [facility])  Discharge Location  Discharge Placement: 
Chart reviewed and discussed with RN. Family leading to comfort measures. CM to follow for any assist.     Care Management Interventions  PCP Verified by CM: Yes  Mode of Transport at Discharge:  Other (see comment)  Transition of Care Consult (CM Consult): Discharge Planning,SNF ([facility])  Support Systems: Child(zaria)  Confirm Follow Up Transport: Family  Freedom of Choice List was Provided with Basic Dialogue that Supports the Patient's Individualized Plan of Care/Goals, Treatment Preferences and Shares the Quality Data Associated with the Providers?: Yes  [place] Resource Information Provided?:  [facility])  Discharge Location  Discharge Placement: Unable to determine at this time
Comfort measures in place. Intensivist informed. Taken off all gtts and BIPAP mask. Nursing supervisor and  called.
Patient brought in via EMS from [facility]. Pt was on nonrebreather at 15L/min with oxygen sat's in the 70's. Upon arrival patient was placed on 100% BiPAP and O2 sat's came up to mid 90's. Patient A&OX4. Dual skin assessment with Helio Whitaker RN. Scattered bruising with no pressure injuries noted. Allyven placed on sacrum.
Patient started on Precedex for agitation. BP now 66/42, 250 normal saline bolus ineffective. Received order for Dopamine gtt.
Patient was placed on a 2L NC for her comfort.
Pt placed on BIPAP - pt is in no distress at this time      01/18/22 0151   Oxygen Therapy   O2 Sat (%) 90 %   Pulse via Oximetry 97 beats per minute   O2 Device BIPAP   FIO2 (%) 100 %   Respiratory   Respiratory (WDL) X   Respiratory Pattern Regular   Chest/Tracheal Assessment Chest expansion, symmetrical   Breath Sounds Bilateral Diminished   Cough Non-productive   CPAP/BIPAP   CPAP/BIPAP Start/Stop On   Device Mode BIPAP   $$ Bipap Daily Yes   Mask Type and Size Full face; Medium   Skin Condition intact   PIP Observed 17 cm H20   IPAP (cm H2O) 16 cm H2O   EPAP (cm H2O) 12 cm H2O   Inspiratory Time (sec) 1 seconds   Vt Spont (ml) 388 ml   Ve Observed (l/min) 14.9 l/min   Backup Rate 18   Total RR (Spontaneous) 38 breaths per minute   Insp Rise Time (sec) 3   Leak (Estimated) 0 L/min   Pt's Home Machine No   Biomedical Check Performed Yes   Settings Verified Yes   Alarm Settings   High Pressure 30   Low Pressure 5   Apnea 20   Low Ve 3   High Rate 50   Low Rate 8   Pulmonary Toilet   Pulmonary Toilet H. O.B elevated;Cough and deep breath
The patient's nurse requested a prayer be given to this patient who is rapidly declining.  prayed for the patient and also had a prayer with and for the patient's nurse.       Cosme Richmond, [address] [facility] [place], [facility]  
This was a visit to a covid patient. I had prayer for the patient outside of the patient's room and I did not enter the room. I received updated information from the patient's nurse concerning this patient's needs. I will continue to follow-up with this patient and offer assistance and prayer.           Cosme Richmond, [address] [facility], [facility]  
Zoom call completed with family. Family wishes to proceed with comfort measures at this time.  Will contact MD.
(1) Outpatient Area